# Patient Record
Sex: FEMALE | Race: WHITE | NOT HISPANIC OR LATINO | URBAN - METROPOLITAN AREA
[De-identification: names, ages, dates, MRNs, and addresses within clinical notes are randomized per-mention and may not be internally consistent; named-entity substitution may affect disease eponyms.]

---

## 2022-12-15 ENCOUNTER — TELEMEDICINE (OUTPATIENT)
Dept: BEHAVIORAL/MENTAL HEALTH CLINIC | Facility: CLINIC | Age: 57
End: 2022-12-15

## 2022-12-15 DIAGNOSIS — F41.1 GENERALIZED ANXIETY DISORDER: Primary | ICD-10-CM

## 2022-12-15 DIAGNOSIS — F32.A DEPRESSION, UNSPECIFIED DEPRESSION TYPE: ICD-10-CM

## 2022-12-15 NOTE — PSYCH
Virtual Regular Visit    Verification of patient location:    Patient is located in the following state in which I hold an active license Other; [place]      Assessment/Plan:    Problem List Items Addressed This Visit    None  Visit Diagnoses     Generalized anxiety disorder    -  Primary    Depression, unspecified depression type              Goals addressed in session: Anxiety, depression         Reason for visit is No chief complaint on file  Encounter provider Beata Bo    Provider located at [address] [place] [address]  [phone]      Recent Visits  No visits were found meeting these conditions  Showing recent visits within past 7 days and meeting all other requirements  Today's Visits  Date Type Provider Dept   12/15/22 Telemedicine [facility]   Showing today's visits and meeting all other requirements  Future Appointments  No visits were found meeting these conditions  Showing future appointments within next 150 days and meeting all other requirements       The patient was identified by name and date of birth  Duke Rhodes was informed that this is a telemedicine visit and that the visit is being conducted throughthe [employer] platform  She agrees to proceed     My office door was closed  No one else was in the room  She acknowledged consent and understanding of privacy and security of the video platform  The patient has agreed to participate and understands they can discontinue the visit at any time  Patient is aware this is a billable service  Subjective  Duke Rhodes is a 62 y o  female    HPI     No past medical history on file  No past surgical history on file  No current outpatient medications on file  No current facility-administered medications for this visit          Not on File    Review of Systems    Video Exam    There were no vitals filed for this visit  Physical Exam     Data:  Client presented for a telehealth session via [facility]  Client reported that since her last appointment (over several months ago) client had slipped into "old habits " She mentioned that she is two months behind rent, her landlord is beginning the eviction process, have not talked to her brothers in months, and her sister is no longer supporting her financially  Client shared that due to this, client had created a [employer] page, which did not get the attention she was hoping for  She expressed that a few friends had reached out thinking she was hacked, and the conversations did not go accordingly  She does have a friend who sent her a check, in which she is waiting for  In the meantime, client had looked/researched for a "regular" job rather than [employer]-ing, but has been unsuccessful  Client addressed that she does not trust herself in committing to a full-time position  Client reported self-doubt and how she is not capable of being successful  She addressed how she used to not have these issues and how she was independent and successful  Assessment:  Client presented with a calm affect and mood  Client was engaged, talkative and oriented  Speech was clear and organized and tone was within normal range  Mood was congruent  Client got tearful throughout session  No reports of SI/HI  Process:  Client processed current events and stressors  Clinician actively listened, provided emotional support and encouraged client to look into reconnecting with her , Kalli Ho  Client and clinician talked about barriers and how she is her only barrier  Clinician asked client to reflect on "why" she is doubting herself, when she has proven that she is capable of being successful and independent (based on previous conversations)  Clinician to e-mail self-worth/self-esteem handouts to review  Plan:  Client is scheduled for a telehealth session on Thursday December 22nd at [facility]  Client plans to reflect on questions addressed in session  Will review handouts and discuss next session       Visit Time  12/15/2022  Visit Start Time: 0900  Visit Stop Time: 6826  Total Visit Duration: 53 minutes

## 2022-12-22 ENCOUNTER — TELEMEDICINE (OUTPATIENT)
Dept: BEHAVIORAL/MENTAL HEALTH CLINIC | Facility: CLINIC | Age: 57
End: 2022-12-22

## 2022-12-22 DIAGNOSIS — F41.1 GENERALIZED ANXIETY DISORDER: Primary | ICD-10-CM

## 2022-12-22 DIAGNOSIS — F32.A DEPRESSION, UNSPECIFIED DEPRESSION TYPE: ICD-10-CM

## 2022-12-22 NOTE — PSYCH
Virtual Regular Visit    Verification of patient location:    Patient is located in the following state in which I hold an active license Other; [place]      Assessment/Plan:    Problem List Items Addressed This Visit    None  Visit Diagnoses     Generalized anxiety disorder    -  Primary    Depression, unspecified depression type              Goals addressed in session: Anxiety       Reason for visit is No chief complaint on file  Encounter provider Karissa Michaels    Provider located at [address] [place] [address]  [phone]      Recent Visits  Date Type Provider Dept   12/15/22 Telemedicine [facility]   Showing recent visits within past 7 days and meeting all other requirements  Today's Visits  Date Type Provider Dept   12/22/22 Telemedicine [facility]   Showing today's visits and meeting all other requirements  Future Appointments  No visits were found meeting these conditions  Showing future appointments within next 150 days and meeting all other requirements       The patient was identified by name and date of birth  Isaías Montiel was informed that this is a telemedicine visit and that the visit is being conducted throughthe [facility] platform  She agrees to proceed     My office door was closed  No one else was in the room  She acknowledged consent and understanding of privacy and security of the video platform  The patient has agreed to participate and understands they can discontinue the visit at any time  Patient is aware this is a billable service  Subjective  Isaías Montiel is a 62 y o  female   HPI     No past medical history on file  No past surgical history on file  No current outpatient medications on file       No current facility-administered medications for this visit  Not on File    Review of Systems    Video Exam    There were no vitals filed for this visit  Physical Exam     Data:  Client arrived for her telehealth session via [address]  Client reported that she had been in contact with her landlord, who is giving her to the beginning of January to pay her overdue rent  Client shared that she is very fortunate to have him, stating that they get along and he is understanding/flexible  Client received money from a friend, which has helped pay for other necessities  Client talked about her sister, and how her sister supported her in connecting with [employer]  Client mentioned that she was able to renew her SNAP benefits, apply for an Obama phone through [employer], obtained resources at [employer] and reconnected with [employer] ()  Client voiced that within the last week, she had applied to several jobs that are within walking distance  Client expressed that she would like to get connected with a , addressing how she does not want her sister to take on that role  Client also talked about an upcoming holiday party that she does not want to attend and described her reasons for not wanting to go  Assessment:  Client presented with a calm affect and mood  Client was engaged, talkative and oriented  Speech was clear and organized and tone was within normal range  Mood was congruent  Eye contact was consistent throughout session  Client was dressed casually but appropriately  No reports of SI/HI  Process:  Client processed current events and stressors  Clinician actively listened, provided emotional support and explored the process of connecting to a  at [facility]  Emphasized the importance of learning and obtaining resources within her community  Provided other free phone services and encouraged her to apply to them  Will look into referring client to case management       Plan:  Client is scheduled for a telehealth session on Thursday December 29th at [address]  Client will continue to apply and look into resources within her community  Follow-up with job applications       Visit Time  12/22/2022  Visit Start Time: 7265  Visit Stop Time: 1000  Total Visit Duration: 56 minutes

## 2022-12-29 ENCOUNTER — TELEMEDICINE (OUTPATIENT)
Dept: BEHAVIORAL/MENTAL HEALTH CLINIC | Facility: CLINIC | Age: 57
End: 2022-12-29

## 2022-12-29 DIAGNOSIS — F32.A DEPRESSION, UNSPECIFIED DEPRESSION TYPE: ICD-10-CM

## 2022-12-29 DIAGNOSIS — F41.1 GENERALIZED ANXIETY DISORDER: Primary | ICD-10-CM

## 2022-12-29 NOTE — PSYCH
Virtual Regular Visit    Verification of patient location:    Patient is located in the following state in which I hold an active license Other; [place]      Assessment/Plan:    Problem List Items Addressed This Visit    None  Visit Diagnoses     Generalized anxiety disorder    -  Primary    Depression, unspecified depression type              Goals addressed in session: Anxiety         Reason for visit is No chief complaint on file  Encounter provider Eva Pires    Provider located at [address] [place] [address] [address]  [address] [place] [address]  [phone]      Recent Visits  Date Type Provider Dept   12/22/22 Telemedicine [facility]   Showing recent visits within past 7 days and meeting all other requirements  Today's Visits  Date Type Provider Dept   12/29/22 Telemedicine [facility] Therap[facility]   Showing today's visits and meeting all other requirements  Future Appointments  No visits were found meeting these conditions  Showing future appointments within next 150 days and meeting all other requirements       The patient was identified by name and date of birth  Kat Hickey was informed that this is a telemedicine visit and that the [facility]  She agrees to proceed     My office door was closed  No one else was in the room  She acknowledged consent and understanding of privacy and security of the video platform  The patient has agreed to participate and understands they can discontinue the visit at any time  Patient is aware this is a billable service  Subjective  Kat Hickey is a 62 y o  female   HPI     No past medical history on file  No past surgical history on file  No current outpatient medications on file       No current facility-administered medications for this visit  Not on File    Review of Systems    Video Exam    There were no vitals filed for this visit  Physical Exam     Data:  Client arrived for her telehealth session via [address]  Client reported that she had kept herself busy since she decided not to attend her family's [place] gathering  She mentioned that she had gone to the [employer], attended [employer] service and spent time with her neighbor  She shared that she had gotten close with her neighbor through the years and considers her and her family friends  Client highlighted that she had spent the holidays the way she wanted  She did reach out to her siblings on Christmas, wishing them all a "Happy holiday " She voiced that it was nice to hear from them  Client was eager to report that she was approved for a [employer] phone and is in the process of waiting to receive the phone in the mail  She mentioned that she is looking forward to being able to text and call people  Client has not heard back from any of the jobs she applied to, but is waiting to follow-up after the new year  Client is looking into connecting with a   Assessment:  Client presented with a calm affect and mood  Client was engaged, talkative and oriented  Speech was clear and organized and tone was within normal range  Mood was congruent  Eye contact was consistent throughout  Client was dressed casually but appropriately  No reports of SI/HI  Process:  Client processed current events and stressors  Clinician actively listened, provided emotional support and was encouraged to hear that she was approved for the [employer] phone  Client was encouraged to seek out a  through the resource program she was provided  Clinician informed client that she may get connected to one sooner through the other program than going through [facility]       Plan:  Client is scheduled for a telehealth session on Wednesday January 4th at 8am  Client will inquire about a , 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

## 2023-01-04 ENCOUNTER — TELEMEDICINE (OUTPATIENT)
Dept: BEHAVIORAL/MENTAL HEALTH CLINIC | Facility: CLINIC | Age: 58
End: 2023-01-04

## 2023-01-04 DIAGNOSIS — F32.A DEPRESSION, UNSPECIFIED DEPRESSION TYPE: ICD-10-CM

## 2023-01-04 DIAGNOSIS — F41.1 GENERALIZED ANXIETY DISORDER: Primary | ICD-10-CM

## 2023-01-04 NOTE — PSYCH
Virtual Regular Visit    Verification of patient location:    Patient is located in the following state in which I hold an active license Other; [place]       Assessment/Plan:    Problem List Items Addressed This Visit    None  Visit Diagnoses     Generalized anxiety disorder    -  Primary    Depression, unspecified depression type              Goals addressed in session: Anxiety, Depression         Reason for visit is No chief complaint on file  Encounter provider Lydia Ware    Provider located at [address] [place] [address]  [phone]      Recent Visits  Date Type Provider Dept   12/29/22 Telemedicine [facility]   Showing recent visits within past 7 days and meeting all other requirements  Today's Visits  Date Type Provider Dept   01/04/23 Telemedicine [facility]   Showing today's visits and meeting all other requirements  Future Appointments  No visits were found meeting these conditions  Showing future appointments within next 150 days and meeting all other requirements       The patient was identified by name and date of birth  Milla Pan was informed that this is a telemedicine visit and that the visit is being conducted through[address]  She agrees to proceed     My office door was closed  No one else was in the room  She acknowledged consent and understanding of privacy and security of the video platform  The patient has agreed to participate and understands they can discontinue the visit at any time  Patient is aware this is a billable service  Subjective  Milla Pan is a 62 y o  female   HPI     No past medical history on file  No past surgical history on file  No current outpatient medications on file       No current facility-administered medications for this visit  Not on File    Review of Systems    Video Exam    There were no vitals filed for this visit  Physical Exam     Data:  Client arrived for her virtual session via [address]  Client reported that she had received her [employer] phone and how she had been taking the time to navigate its features  She mentioned that she had already sent texts and made outreach calls to confirm the number was active  Client shared that she had spent most of the day yesterday; filling out applications for housing and utility support  Client expressed that she had been looking into programs provided by [employer]  Client spoke in-length about her landlord and how flexible and patient he has been towards her  She noted that she owes $2,700 in rent and acknowledged that he is proceeding with the eviction process  Client addressed her role within the situation and how she can only blame herself  Client talked about the heightened anxiety she has been experiencing and how she has been utilizing deep breathing, mindfulness meditation to help ease her anxiety  Client shared that her sister was planning to come over for lunch  She addressed not wanting to tell her sister what is going on, but felt she needs to at some point  Assessment:  Client presented with a calm affect and mood  Client was engaged, talkative and oriented  Speech was clear and organized and tone was within normal range  Mood was congruent  Eye contact was consistent throughout and client was dressed casually but appropriately  No reports of SI/HI  Process:  Client processed current events and stressors  Clinician actively listened, provided emotional support and encouraged client to follow-up with her applications  Discussed that she does not "owe anyone an explanation" and how she can decide how much or how little she wants to share with her family  Talked about the concept of control and addressing what is within her control   Will e-mail client material related to anxiety for her to review  Plan:  Client is scheduled for a telehealth session on Thursday January 12th at [address] will continue to utilize deep breathing techniques to help manage anxiety  Will reflect on what is within her control and decide what she feels comfortable disclosing and review anxiety handout(s)      Visit Time  01/04/23  Visit Start Time: 0800  Visit Stop Time: 1280  Total Visit Duration: 57 minutes

## 2023-01-10 ENCOUNTER — TELEPHONE (OUTPATIENT)
Dept: PSYCHIATRY | Facility: CLINIC | Age: 58
End: 2023-01-10

## 2023-01-10 NOTE — TELEPHONE ENCOUNTER
Need updated phone number  Number on file is disconnected  Please call [phone] or update information in my chart    Thank you

## 2023-01-12 ENCOUNTER — TELEPHONE (OUTPATIENT)
Dept: PSYCHIATRY | Facility: CLINIC | Age: 58
End: 2023-01-12

## 2023-01-12 NOTE — TELEPHONE ENCOUNTER
Tried to contacted patient to get 1/12/23 appt rescheduled due to [address] out of the office  Patient to call back to reschedule     Phone number on file is out of service

## 2023-01-19 ENCOUNTER — TELEPHONE (OUTPATIENT)
Dept: BEHAVIORAL/MENTAL HEALTH CLINIC | Facility: CLINIC | Age: 58
End: 2023-01-19

## 2023-01-19 NOTE — TELEPHONE ENCOUNTER
Clinician received email from client at 7:30am this morning needing to cancel today's appointment  Client not feeling well   Next appointment scheduled for Thursday January 26th at [facility]

## 2023-01-26 ENCOUNTER — TELEMEDICINE (OUTPATIENT)
Dept: BEHAVIORAL/MENTAL HEALTH CLINIC | Facility: CLINIC | Age: 58
End: 2023-01-26

## 2023-01-26 DIAGNOSIS — F32.A DEPRESSION, UNSPECIFIED DEPRESSION TYPE: ICD-10-CM

## 2023-01-26 DIAGNOSIS — F41.1 GENERALIZED ANXIETY DISORDER: Primary | ICD-10-CM

## 2023-01-26 NOTE — PSYCH
Virtual Regular Visit    Verification of patient location:    Patient is located in the following state in which I hold an active license Other; [place]      Assessment/Plan:    Problem List Items Addressed This Visit    None  Visit Diagnoses     Generalized anxiety disorder    -  Primary    Depression, unspecified depression type              Goals addressed in session: Goal 1          Reason for visit is   Chief Complaint   Patient presents with   • Virtual Regular Visit        Encounter provider Siddharth Hamlin    Provider located at [address] [place] [address]  [phone]      Recent Visits  Date Type Provider Dept   01/19/23 Telephone [facility]   Showing recent visits within past 7 days and meeting all other requirements  Today's Visits  Date Type Provider Dept   01/26/23 Telemedicine [facility]   Showing today's visits and meeting all other requirements  Future Appointments  No visits were found meeting these conditions  Showing future appointments within next 150 days and meeting all other requirements       The patient was identified by name and date of birth  Micha Salazar was informed that this is a telemedicine visit and [facility] the [facility]  She agrees to proceed     My office door was closed  No one else was in the room  She acknowledged consent and understanding of privacy and security of the video platform  The patient has agreed to participate and understands they can discontinue the visit at any time  Patient is aware this is a billable service  Subjective  Micha Salazar is a 62 y o  female   HPI     No past medical history on file  No past surgical history on file  No current outpatient medications on file       No current facility-administered medications for this visit  Not on File    Review of Systems    Video Exam    There were no vitals filed for this visit  Physical Exam     Behavioral Health Psychotherapy Progress Note    Psychotherapy Provided: Individual Psychotherapy     1  Generalized anxiety disorder        2  Depression, unspecified depression type            Goals addressed in session: Goal 1     DATA: Client presented for her telehealth session via [address]  Client reported that she was approved for rental assistance and how her rent is taken care of until April  Client shared that she is not responsible to pay full rent until May  She noted that she was a bit concerned about the notification she received about the rental assistance  She was never given a formal a letter stating her approval  She received an email  She is hoping that her landlord had received this information  Client also shared that she got a job and started on Tuesday  She mentioned that she got a job at the [employer], stating that it is three blocks away  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  She mentioned having to work four days a week and is off on the weekends  Client highlighted how she is happy to have accomplished two goals that she had last session  Client voiced that her siblings are happy for her  During this session, this clinician used the following therapeutic modalities: Client-centered Therapy    Substance Abuse was not addressed during this session  If the client is diagnosed with a co-occurring substance use disorder, please indicate any changes in the frequency or amount of use: N/A  Stage of change for addressing substance use diagnoses: No substance use/Not applicable    ASSESSMENT:  Steven Gutierrez presents with a Euthymic/ normal mood  her affect is Normal range and intensity, which is congruent, with her mood and the content of the session   The client has made progress on their goals  Jaskaran Rivera presents with a low risk of suicide, low risk of self-harm, and low risk of harm to others  For any risk assessment that surpasses a "low" rating, a safety plan must be developed  A safety plan was indicated: no  If yes, describe in detail N/A    PLAN: Between sessions, Jaskaran Rivera will continue to work towards personal goals and adjust to working  Continue to manage anxiety symptoms as they come  At the next session, the therapist will use Client-centered Therapy and Cognitive Behavioral Therapy to address anxiety  Behavioral Health Treatment Plan and Discharge Planning: Jaskaran Rivera is aware of and agrees to continue to work on their treatment plan  They have identified and are working toward their discharge goals   yes    Visit start and stop times:    01/26/23  Start Time: 0905  Stop Time: 0956  Total Visit Time: 51 minutes

## 2023-02-02 ENCOUNTER — TELEMEDICINE (OUTPATIENT)
Dept: BEHAVIORAL/MENTAL HEALTH CLINIC | Facility: CLINIC | Age: 58
End: 2023-02-02

## 2023-02-02 DIAGNOSIS — F41.1 GENERALIZED ANXIETY DISORDER: Primary | ICD-10-CM

## 2023-02-02 NOTE — PSYCH
Virtual Regular Visit    Verification of patient location:    Patient is located in the following state in which I hold an active license Other; Currently working towards [place] lic[place]       Assessment/Plan:    Problem List Items Addressed This Visit    None  Visit Diagnoses     Generalized anxiety disorder    -  Primary          Goals addressed in session: Anxiety          Reason for visit is   Chief Complaint   Patient presents with   • Virtual Regular Visit        Encounter provider Ashish Henry    Provider located at [address] [place] [address] [address]  [address] [place] [address]  [phone]      Recent Visits  Date Type Provider Dept   01/26/23 [facility]   Showing recent visits within past 7 days and meeting all other requirements  Today's Visits  Date Type Provider Dept   02/02/23 [facility]   Showing today's visits and meeting all other requirements  Future Appointments  No visits were found meeting these conditions  Showing future appointments within next 150 days and meeting all other requirements       The patient was identified by name and date of birth  Ruperto Boas was informed that this is a telemedicine visit and that the [facility]  She agrees to proceed     My office door was closed  No one else was in the room  She acknowledged consent and understanding of privacy and security of the video platform  The patient has agreed to participate and understands they can discontinue the visit at any time  Patient is aware this is a billable service  Subjective  Ruperto Boas is a 62 y o  female   HPI     No past medical history on file  No past surgical history on file  No current outpatient medications on file       No current facility-administered medications for this visit  Not on File    Review of Systems    Video Exam    There were no vitals filed for this visit  Physical Exam     Behavioral Health Psychotherapy Progress Note    Psychotherapy Provided: Individual Psychotherapy     1  Generalized anxiety disorder            Goals addressed in session: Anxiety    DATA: Client presented for her telehealth session via [address]  Client reported that she is still adjusting to work  She mentioned that she is feeling more tired than usual, but believes this is due to the physicality of the job  She described having to be on her feet majority of the shift and is allowed to sit for a few minutes  Client expressed how accommodating her boss is when it comes to her diabetes and needing to eat and sit down  Client shared that she gets along with her coworkers and that she received a new schedule which will begin in the next couple weeks  Client addressed that she had talked with her landlord and he received his rent up until April  Client spoke about feeling relieved and is looking forward to rebuilding her financial situation  Tiny talked about needing to re-establish a budget and be more mindful of her bills and extra expenditures  Client voiced that although she is happy to have found a job of walking distance, she still feels as though she is not doing enough  She also disclosed disappointment within herself that she had to reach out for rental assistance  Client also disclosed that today is the anniversary of her father's passing and how it has been the first time in years where she is not in a very sad/depressive state over it  During this session, this clinician used the following therapeutic modalities: Bereavement Therapy, Client-centered Therapy and Cognitive Behavioral Therapy    Substance Abuse was not addressed during this session   If the client is diagnosed with a co-occurring substance use disorder, please indicate any changes in the frequency or amount of use: N/A  Stage of change for addressing substance use diagnoses: No substance use/Not applicable    ASSESSMENT:  Chantel Kaminski presents with a Euthymic/ normal mood  her affect is Normal range and intensity, which is congruent, with her mood and the content of the session  The client has made progress on their goals  Chantel Kaminksi presents with a low risk of suicide, low risk of self-harm, and low risk of harm to others  For any risk assessment that surpasses a "low" rating, a safety plan must be developed  A safety plan was indicated: no  If yes, describe in detail N/A    PLAN: Between sessions, Chantel Kaminski will continue to write down small, obtainable goals, will work on budgeting and to do something in honor of her father  At the next session, the therapist will use Client-centered Therapy and Cognitive Behavioral Therapy to address anxiety  Behavioral Health Treatment Plan and Discharge Planning: Chantel Kaminski is aware of and agrees to continue to work on their treatment plan  They have identified and are working toward their discharge goals   yes    Visit start and stop times:    02/02/23  Start Time: 0902  Stop Time: 1000  Total Visit Time: 58 minutes

## 2023-02-09 ENCOUNTER — TELEMEDICINE (OUTPATIENT)
Dept: BEHAVIORAL/MENTAL HEALTH CLINIC | Facility: CLINIC | Age: 58
End: 2023-02-09

## 2023-02-09 DIAGNOSIS — F41.1 GENERALIZED ANXIETY DISORDER: Primary | ICD-10-CM

## 2023-02-09 NOTE — PSYCH
Virtual Regular Visit    Verification of patient location:    Patient is located in the following state in which I hold an active license Other; [address] working towards [place] licensure       Assessment/Plan:    Problem List Items Addressed This Visit    None  Visit Diagnoses     Generalized anxiety disorder    -  Primary          Goals addressed in session: Goal 1          Reason for visit is   Chief Complaint   Patient presents with   • Virtual Regular Visit        Encounter provider Connie Godoy    Provider located at [address] [place] [address]  [phone]      Recent Visits  Date Type Provider Dept   02/02/23 [facility]   Showing recent visits within past 7 days and meeting all other requirements  Today's Visits  Date Type Provider Dept   02/09/23 [facility]   Showing today's visits and meeting all other requirements  Future Appointments  No visits were found meeting these conditions  Showing future appointments within next 150 days and meeting all other requirements       The patient was identified by name and date of birth  Genoveva Carbajal was informed that this is a telemedicine visit and that [facility]  She agrees to proceed     My office door was closed  No one else was in the room  She acknowledged consent and understanding of privacy and security of the video platform  The patient has agreed to participate and understands they can discontinue the visit at any time  Patient is aware this is a billable service  Subjective  Genoveva Carbajal is a 62 y o  female   HPI     No past medical history on file  No past surgical history on file  No current outpatient medications on file       No current facility-administered medications for this visit  Not on File    Review of Systems    Video Exam    There were no vitals filed for this visit  Physical Exam     Behavioral Health Psychotherapy Progress Note    Psychotherapy Provided: Individual Psychotherapy     1  Generalized anxiety disorder            Goals addressed in session: Goal 1     DATA: Client presented for her telehealth session via [address]  Client reported that she has began feeling the physical effects of the job  She mentioned that cleaning and being on her feet have taken a toll on her body  Client shared that she had recently found out that her one co-worker is transgender (male to female) and was curious to learn more about the process  Client expressed wanting to educate herself a little more around this, making note how this is all still fairly new to her  Client talked about her birthday and how her work made her feel special  She highlighted that a customer had come in and brought her a cupcake to celebrate  She was also given a jon and a sash to wear during her shift  She is supposed to be getting lunch with her sister later to celebrate  During this session, this clinician used the following therapeutic modalities: Client-centered Therapy and Cognitive Behavioral Therapy    Substance Abuse was not addressed during this session  If the client is diagnosed with a co-occurring substance use disorder, please indicate any changes in the frequency or amount of use: N/A  Stage of change for addressing substance use diagnoses: No substance use/Not applicable    ASSESSMENT:  Chantel Kaminski presents with a Euthymic/ normal mood  her affect is Normal range and intensity, which is congruent, with her mood and the content of the session  The client has made progress on their goals  Chantel Kaminski presents with a low risk of suicide, low risk of self-harm, and low risk of harm to others      For any risk assessment that surpasses a "low" rating, a safety plan must be developed  A safety plan was indicated: no  If yes, describe in detail N/A    PLAN: Between sessions, Jessica Cooper will continue to manage anxiety and work  At the next session, the therapist will use Client-centered Therapy and Cognitive Behavioral Therapy to address anxiety and work stressors  Behavioral Health Treatment Plan and Discharge Planning: Jessica Cooper is aware of and agrees to continue to work on their treatment plan  They have identified and are working toward their discharge goals   yes    Visit start and stop times:    02/09/23  Start Time: 1000  Stop Time: 1058  Total Visit Time: 58 minutes

## 2023-02-16 ENCOUNTER — TELEMEDICINE (OUTPATIENT)
Dept: BEHAVIORAL/MENTAL HEALTH CLINIC | Facility: CLINIC | Age: 58
End: 2023-02-16

## 2023-02-16 DIAGNOSIS — F41.1 GENERALIZED ANXIETY DISORDER: Primary | ICD-10-CM

## 2023-02-16 NOTE — PSYCH
Virtual Regular Visit    Verification of patient location:    Patient is located in the following state in which I hold an active license Other; [place]       Assessment/Plan:    Problem List Items Addressed This Visit    None  Visit Diagnoses     Generalized anxiety disorder    -  Primary          Goals addressed in session: Goal 1          Reason for visit is   Chief Complaint   Patient presents with   • Virtual Regular Visit        Encounter provider Jorge Billy    Provider located at [address] [place] [address] [address]  [address] [place] [address]  [phone]      Recent Visits  Date Type Provider Dept   02/09/23 [facility]   Showing recent visits within past 7 days and meeting all other requirements  Today's Visits  Date Type Provider Dept   02/16/23 [facility]   Showing today's visits and meeting all other requirements  Future Appointments  No visits were found meeting these conditions  Showing future appointments within next 150 days and meeting all other requirements       The patient was identified by name and date of birth  Rock Tinajero was informed that this is a telemedicine visit and that the [facility]  She agrees to proceed     My office door was closed  No one else was in the room  She acknowledged consent and understanding of privacy and security of the video platform  The patient has agreed to participate and understands they can discontinue the visit at any time  Patient is aware this is a billable service  Subjective  Rock Tinajero is a 62 y o  female   HPI     No past medical history on file  No past surgical history on file  No current outpatient medications on file       No current facility-administered medications for this visit  Not on File    Review of Systems    Video Exam    There were no vitals filed for this visit  Physical Exam     Behavioral Health Psychotherapy Progress Note    Psychotherapy Provided: Individual Psychotherapy     1  Generalized anxiety disorder            Goals addressed in session: Goal 1     DATA: Client presented for a telehealth session via [address]  Client reported that she has been closely monitoring her sugar levels, since they were extremely high after eating oatmeal with a banana  She mentioned that she had switched it up today; eating blueberries with oatmeal and how her levels are normal  She expressed that she was going to see if she could eat a banana by itself and if this will effect her sugar levels  In the meantime, client talked in-length about her job  She described the parts of the job she likes and dislikes  She acknowledged that this is just a job, and one that she does not feel fully satisfied in  She does enjoy the flexibility and how they are accommodating  She will begin her new work schedule next week (work two days, off on Wednesday and work the other two days)  Client spoke about her brother and how she is worried about him  She voiced that she has not seen him since last Easter and how she saw a recent photo of him and felt he did not look right  During this session, this clinician used the following therapeutic modalities: Client-centered Therapy and Cognitive Behavioral Therapy    Substance Abuse was not addressed during this session  If the client is diagnosed with a co-occurring substance use disorder, please indicate any changes in the frequency or amount of use: N/A  Stage of change for addressing substance use diagnoses: No substance use/Not applicable    ASSESSMENT:  Ruperto Boas presents with a Euthymic/ normal mood  her affect is Normal range and intensity, which is congruent, with her mood and the content of the session   The client has made progress on their goals  Scarlett Pereyra presents with a low risk of suicide, low risk of self-harm, and low risk of harm to others  For any risk assessment that surpasses a "low" rating, a safety plan must be developed  A safety plan was indicated: no  If yes, describe in detail N/A    PLAN: Between sessions, Scarlett Pereyra will continue to go to work and manage stressors/anxieties related to work  At the next session, the therapist will use Client-centered Therapy and Cognitive Behavioral Therapy to address anxiety  Behavioral Health Treatment Plan and Discharge Planning: Scarlett Pereyra is aware of and agrees to continue to work on their treatment plan  They have identified and are working toward their discharge goals   yes    Visit start and stop times:    02/16/23  Start Time: 0900  Stop Time: 0959  Total Visit Time: 59 minutes

## 2023-02-23 ENCOUNTER — TELEMEDICINE (OUTPATIENT)
Dept: BEHAVIORAL/MENTAL HEALTH CLINIC | Facility: CLINIC | Age: 58
End: 2023-02-23

## 2023-02-23 DIAGNOSIS — F41.1 GENERALIZED ANXIETY DISORDER: Primary | ICD-10-CM

## 2023-02-23 NOTE — PSYCH
Virtual Regular Visit    Verification of patient location:    Patient is located in the following state in which I hold an active license Other; Currently [place] towards [place]       Assessment/Plan:    Problem List Items Addressed This Visit    None  Visit Diagnoses     Generalized anxiety disorder    -  Primary          Goals addressed in session: Goal 1          Reason for visit is   Chief Complaint   Patient presents with   • Virtual Regular Visit        Encounter provider Ayde Alaniz    Provider located at [address] [place] [address] [address]  [address] [place] [address]  [phone]      Recent Visits  Date Type Provider Dept   02/16/23 [facility]   Showing recent visits within past 7 days and meeting all other requirements  Today's Visits  Date Type Provider Dept   02/23/23 [facility]   Showing today's visits and meeting all other requirements  Future Appointments  No visits were found meeting these conditions  Showing future appointments within next 150 days and meeting all other requirements       The patient was identified by name and date of birth  Crissy Reina was informed that this is a telemedicine visit and that the [facility]  She agrees to proceed     My office door was closed  No one else was in the room  She acknowledged consent and understanding of privacy and security of the video platform  The patient has agreed to participate and understands they can discontinue the visit at any time  Patient is aware this is a billable service  Subjective  Crissy Reina is a 62 y o  female   HPI     No past medical history on file  No past surgical history on file  No current outpatient medications on file       No current facility-administered medications for this visit  Not on File    Review of Systems    Video Exam    There were no vitals filed for this visit  Physical Exam     Behavioral Health Psychotherapy Progress Note    Psychotherapy Provided: Individual Psychotherapy     1  Generalized anxiety disorder            Goals addressed in session: Goal 1     DATA: Client presented for a telehealth session via [address]  Client reported that she had been doing well overall  She shared that she had out to lunch with her sister; to celebrate their belated birthdays and how they had gone shopping afterwards  Client highlighted that they had a "really nice afternoon together " Client talked about her job and how people are noticing a change in her mood  As discussed in previous session; this job is not her desired/dream job, but it provides her financial stability, community engagement and independence  She addressed that her sister had made a comment about her overall mood and how she seems "happier " Client talked about her brother and how she was able to get a hold of him  She voiced that he is not doing well physically  She noted that he has been on and off antibiotics due to an infection and the doctors just discovered another tumor  She mentioned wanting to invite him and his wife over for dinner this weekend or find transportation to travel to their place if he is too tired  During this session, this clinician used the following therapeutic modalities: Client-centered Therapy and Cognitive Behavioral Therapy    Substance Abuse was not addressed during this session  If the client is diagnosed with a co-occurring substance use disorder, please indicate any changes in the frequency or amount of use: N/A  Stage of change for addressing substance use diagnoses: No substance use/Not applicable    ASSESSMENT:  Maribel Suero presents with a Euthymic/ normal mood       her affect is Normal range and intensity, which is congruent, with her mood and the content of the session  The client has made progress on their goals  Sarah Faustin presents with a low risk of suicide, low risk of self-harm, and low risk of harm to others  For any risk assessment that surpasses a "low" rating, a safety plan must be developed  A safety plan was indicated: no  If yes, describe in detail N/A    PLAN: Between sessions, Sarah Faustin will continue to work her scheduled shifts at work  Will figure out transportation if needed to see her brother  Will continue to utilize coping skills to manage stress/anxiety  At the next session, the therapist will use Client-centered Therapy and Cognitive Behavioral Therapy to address anxieties related to work and family  Behavioral Health Treatment Plan and Discharge Planning: Sarah Faustin is aware of and agrees to continue to work on their treatment plan  They have identified and are working toward their discharge goals   yes    Visit start and stop times:    02/23/23  Start Time: 0900  Stop Time: 0959  Total Visit Time: 59 minutes

## 2023-03-02 ENCOUNTER — TELEPHONE (OUTPATIENT)
Dept: BEHAVIORAL/MENTAL HEALTH CLINIC | Facility: CLINIC | Age: 58
End: 2023-03-02

## 2023-03-02 NOTE — TELEPHONE ENCOUNTER
Clinician received e-mail from client needing to cancel today's appointment  Next appointment scheduled for next week

## 2023-03-09 ENCOUNTER — TELEPHONE (OUTPATIENT)
Dept: BEHAVIORAL/MENTAL HEALTH CLINIC | Facility: CLINIC | Age: 58
End: 2023-03-09

## 2023-03-09 NOTE — TELEPHONE ENCOUNTER
Clinician received e-mail from client, stating that she got called into work  Requested clinician to provide dates/times to reschedule  Clinician responded to e-mail, informing client of next availability  Will schedule once an appointment is confirmed

## 2023-04-26 ENCOUNTER — TELEMEDICINE (OUTPATIENT)
Dept: BEHAVIORAL/MENTAL HEALTH CLINIC | Facility: CLINIC | Age: 58
End: 2023-04-26

## 2023-04-26 DIAGNOSIS — F41.1 GENERALIZED ANXIETY DISORDER: Primary | ICD-10-CM

## 2023-04-26 NOTE — PSYCH
Virtual Regular Visit    Verification of patient location:    Patient is located at Home in the following state in which I hold an active license Other; [place] working towards [place] lic[place]       Assessment/Plan:    Problem List Items Addressed This Visit    None  Visit Diagnoses     Generalized anxiety disorder    -  Primary          Goals addressed in session: Goal 1          Reason for visit is   Chief Complaint   Patient presents with   • Virtual Regular Visit        Encounter provider Latrell Jolley    Provider located at [address] [place] [address] [address]  [address] [place] [address]  [phone]      Recent Visits  No visits were found meeting these conditions  Showing recent visits within past 7 days and meeting all other requirements  Today's Visits  Date Type Provider Dept   04/26/23 Jaya Esteban Therapist [facility]   Showing today's visits and meeting all other requirements  Future Appointments  No visits were found meeting these conditions  Showing future appointments within next 150 days and meeting all other requirements       The patient was identified by name and date of birth  Ector Anderson was informed that this is a telemedicine visit and [facility] the [facility]  She agrees to proceed     My office door was closed  No one else was in the room  She acknowledged consent and understanding of privacy and security of the video platform  The patient has agreed to participate and understands they can discontinue the visit at any time  Patient is aware this is a billable service  Subjective  Ector Anderson is a 62 y o  female   HPI     No past medical history on file  No past surgical history on file  No current outpatient medications on file  No current facility-administered medications for this visit          Not on File    Review of Systems    Video Exam    There were no vitals filed for this visit  Physical Exam     Behavioral Health Psychotherapy Progress Note    Psychotherapy Provided: Individual Psychotherapy     1  Generalized anxiety disorder            Goals addressed in session: Goal 1     DATA: Client presented for a telehealth session via [address]  Client reported that work has been good but busy  She shared that it is still physically tasking, but feels that she has adjusted to the work  Client expressed wanting to supplement her income through [employer], 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  Client addressed the pros and cons of [employer] and how she needs to find more work  She disclosed that her rent will increase beginning July 1st, and how she needs to be making more to afford the new rent  Client spoke in-length about [employer] and how she would need to rent a car from them, decide whether to rent an electric car or a gas car  She considered all factors and how she is unsure if she should follow through with [employer]  She highlighted the flexibility, but is uncertain if this is worth her time  Client talked in-length about this and wanted insight on what direction she should go  Client and clinician discussed [employer] and other options to consider (look for another part-time job, 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)  Client shared that she was going to see if she could 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  During this session, this clinician used the following therapeutic modalities: Client-centered Therapy and Cognitive Behavioral Therapy    Substance Abuse was not addressed during this session  If the client is diagnosed with a co-occurring substance use disorder, please indicate any changes in the frequency or amount of use: N/A  Stage of change for addressing substance use diagnoses: No substance use/Not applicable    ASSESSMENT:  Klarissa Sams presents with a Euthymic/ normal mood       her "affect is Normal range and intensity, which is congruent, with her mood and the content of the session  The client has made progress on their goals  Michael Funk presents with a low risk of suicide, low risk of self-harm, and low risk of harm to others  For any risk assessment that surpasses a \"low\" rating, a safety plan must be developed  A safety plan was indicated: no  If yes, describe in detail N/A    PLAN: Between sessions, Michael Funk will 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

## 2023-05-10 ENCOUNTER — TELEMEDICINE (OUTPATIENT)
Dept: BEHAVIORAL/MENTAL HEALTH CLINIC | Facility: CLINIC | Age: 58
End: 2023-05-10

## 2023-05-10 DIAGNOSIS — F41.1 GENERALIZED ANXIETY DISORDER: Primary | ICD-10-CM

## 2023-05-10 NOTE — PSYCH
Virtual Regular Visit    Verification of patient location:    Patient is located at Home in the following state in which I hold an active license Other; [place]        Assessment/Plan:    Problem List Items Addressed This Visit    None  Visit Diagnoses     Generalized anxiety disorder    -  Primary          Goals addressed in session: Goal 1          Reason for visit is   Chief Complaint   Patient presents with   • Virtual Regular Visit        Encounter provider Dayanara Moreno    Provider located at [address] [place], [place] [id number]  [address] [place] [address]  [phone]      Recent Visits  No visits were found meeting these conditions  Showing recent visits within past 7 days and meeting all other requirements  Today's Visits  Date Type Provider Dept   05/10/23 Jaya Etseban Therapist [facility]   Showing today's visits and meeting all other requirements  Future Appointments  No visits were found meeting these conditions  Showing future appointments within next 150 days and meeting all other requirements       The patient was identified by name and date of birth  Erma Pantoja was informed that this is a telemedicine visit and [facility] the [facility]  She agrees to proceed     My office door was closed  No one else was in the room  She acknowledged consent and understanding of privacy and security of the video platform  The patient has agreed to participate and understands they can discontinue the visit at any time  Patient is aware this is a billable service  Subjective  Erma Pantoja is a 62 y o  female   HPI     No past medical history on file  No past surgical history on file  No current outpatient medications on file  No current facility-administered medications for this visit          Not on File    Review of Systems    Video Exam    There were no vitals filed for this visit  Physical Exam     Behavioral Health Psychotherapy Progress Note    Psychotherapy Provided: Individual Psychotherapy     1  Generalized anxiety disorder            Goals addressed in session: Goal 1     DATA: Client presented for a telehealth session via [address]  Client reported that she was doing well overall  She expressed concerns regarding her medicaid benefits, stating that she has been hearing on the news the possibility of losing these benefits  Client shared that she is afraid to renew her benefits, fearing that she will no longer qualify  Clinician validated her concerns and explored this further  Discussed reaching out to her insurance company to inquire if these changes would effect her  Client talked in-length about a surprise party her family hosted for her brother, Kirill Moreland  Client highlighted having a nice time and being able to see family and extended family  She emphasized being able to catch up with those she does not see often  She eagerly shared that 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 and how 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 left  Client addressed that it had been years since she had picked up a villa, and how she was able to go through an old routine flawlessly  Clinician actively listened, provided emotional support and was encouraged to hear that client attended her brother's party  Client was unsure if she was going to go, but was happy that she had gone  During this session, this clinician used the following therapeutic modalities: Engagement Strategies, Client-centered Therapy and Cognitive Behavioral Therapy    Substance Abuse was not addressed during this session  If the client is diagnosed with a co-occurring substance use disorder, please indicate any changes in the frequency or amount of use: N/A   Stage of change for addressing substance use diagnoses: No substance use/Not "applicable    ASSESSMENT:  Bayron Coleman presents with a Euthymic/ normal mood  her affect is Normal range and intensity, which is congruent, with her mood and the content of the session  The client has made progress on their goals  Bayron Coleman presents with a low risk of suicide, low risk of self-harm, and low risk of harm to others  For any risk assessment that surpasses a \"low\" rating, a safety plan must be developed  A safety plan was indicated: no  If yes, describe in detail N/A    PLAN: Between sessions, Bayron Coleman will continue to utilize coping skills to manage stress/anxiety  Will inquire about insurance coverage if she is concerned  At the next session, the therapist will use Engagement Strategies, Client-centered Therapy and Cognitive Behavioral Therapy to address anxiety  Behavioral Health Treatment Plan and Discharge Planning: Bayron Coleman is aware of and agrees to continue to work on their treatment plan  They have identified and are working toward their discharge goals   yes    Visit start and stop times:    05/10/23  Start Time: 0900  Stop Time: 3404  Total Visit Time: 55 minutes    "

## 2023-05-17 ENCOUNTER — TELEMEDICINE (OUTPATIENT)
Dept: BEHAVIORAL/MENTAL HEALTH CLINIC | Facility: CLINIC | Age: 58
End: 2023-05-17

## 2023-05-17 DIAGNOSIS — F41.1 GENERALIZED ANXIETY DISORDER: Primary | ICD-10-CM

## 2023-05-17 NOTE — PSYCH
Virtual Regular Visit    Verification of patient location:    Patient is located at Home in the following state in which I hold an active license Other; [place]       Assessment/Plan:    Problem List Items Addressed This Visit    None  Visit Diagnoses     Generalized anxiety disorder    -  Primary          Goals addressed in session: Goal 1          Reason for visit is   Chief Complaint   Patient presents with   • Virtual Regular Visit        Encounter provider Angelo Henry    Provider located at [address] [place] [address]  [phone]      Recent Visits  Date Type Provider Dept   05/10/23 [facility]   Showing recent visits within past 7 days and meeting all other requirements  Today's Visits  Date Type Provider Dept   05/17/23 [facility]med[facility]   Showing today's visits and meeting all other requirements  Future Appointments  No visits were found meeting these conditions  Showing future appointments within next 150 days and meeting all other requirements       The patient was identified by name and date of birth  Siri Trinidad was informed that this is a telemedicine visit and that the [facility]  She agrees to proceed     My office door was closed  No one else was in the room  She acknowledged consent and understanding of privacy and security of the video platform  The patient has agreed to participate and understands they can discontinue the visit at any time  Patient is aware this is a billable service  Subjective  Siri Trinidad is a 62 y o  female   HPI     No past medical history on file  No past surgical history on file  No current outpatient medications on file       No current facility-administered medications for this visit  Not on File    Review of Systems    Video Exam    There were no vitals filed for this visit  Physical Exam     Behavioral Health Psychotherapy Progress Note    Psychotherapy Provided: Individual Psychotherapy     1  Generalized anxiety disorder            Goals addressed in session: Goal 1     DATA: Client presented for a telehealth session via [address]  Client reported that things have been going well overall  She shared that she has been working a lot and trying to rebuild her financials  She shared that she had a dream about money, stating that she knew where this dream stemmed from  Client expressed that she was recently denied [employer] Wiley and how she had applied for [employer] (financial support for utilities)  Client voiced that her landlord is increasing rent from $850 to $900 come July 1st  Client noted that she took this personally, thinking that her landlord wanted her out  Clinician actively listened, provided emotional support and explored ways to help rebuild her financials  Discussed consolidating her credit and debt relief programs  Talked about July 1st being the date for a lot of landlords increasing rent and why this date specifically  During this session, this clinician used the following therapeutic modalities: Client-centered Therapy and Cognitive Behavioral Therapy    Substance Abuse was not addressed during this session  If the client is diagnosed with a co-occurring substance use disorder, please indicate any changes in the frequency or amount of use: N/A  Stage of change for addressing substance use diagnoses: No substance use/Not applicable    ASSESSMENT:  Kvng Pain presents with a Euthymic/ normal mood  her affect is Normal range and intensity, which is congruent, with her mood and the content of the session  The client has made progress on their goals       Kvng Pain presents with a low risk of suicide, low risk of self-harm, and low risk of "harm to others  For any risk assessment that surpasses a \"low\" rating, a safety plan must be developed  A safety plan was indicated: no  If yes, describe in detail N/A    PLAN: Between sessions, Patrice Rivas will continue to utilize coping skills to manage stress/anxiety  Will look into finding ways to rebuild financials  At the next session, the therapist will use Client-centered Therapy and Cognitive Behavioral Therapy to address anxieties related to financial struggles  Behavioral Health Treatment Plan and Discharge Planning: Patrice Rivas is aware of and agrees to continue to work on their treatment plan  They have identified and are working toward their discharge goals   yes    Visit start and stop times:    05/17/23  Start Time: 0906  Stop Time: 1000  Total Visit Time: 54 minutes      "

## 2023-05-24 ENCOUNTER — TELEMEDICINE (OUTPATIENT)
Dept: BEHAVIORAL/MENTAL HEALTH CLINIC | Facility: CLINIC | Age: 58
End: 2023-05-24

## 2023-05-24 DIAGNOSIS — F41.1 GENERALIZED ANXIETY DISORDER: Primary | ICD-10-CM

## 2023-05-24 NOTE — PSYCH
Virtual Regular Visit    Verification of patient location:    Patient is located at Home in the following state in which I hold an active license Other; [place]       Assessment/Plan:    Problem List Items Addressed This Visit    None  Visit Diagnoses     Generalized anxiety disorder    -  Primary          Goals addressed in session: Goal 1          Reason for visit is   Chief Complaint   Patient presents with   • Virtual Regular Visit        Encounter provider Myron Pickering    Provider located at [address] [place] [address]  [phone]      Recent Visits  Date Type Provider Dept   05/17/23 [facility]   Showing recent visits within past 7 days and meeting all other requirements  Today's Visits  Date Type Provider Dept   05/24/23 [facility]   Showing today's visits and meeting all other requirements  Future Appointments  No visits were found meeting these conditions  Showing future appointments within next 150 days and meeting all other requirements       The patient was identified by name and date of birth  Sg Nguyen was informed that this is a telemedicine visit and [facility] the [facility]  She agrees to proceed     My office door was closed  No one else was in the room  She acknowledged consent and understanding of privacy and security of the video platform  The patient has agreed to participate and understands they can discontinue the visit at any time  Patient is aware this is a billable service  Subjective  Sg Nguyen is a 62 y o  female   HPI     No past medical history on file  No past surgical history on file  No current outpatient medications on file       No current facility-administered medications for this visit  Not on File    Review of Systems    Video Exam    There were no vitals filed for this visit  Physical Exam     Behavioral Health Psychotherapy Progress Note    Psychotherapy Provided: Individual Psychotherapy     1  Generalized anxiety disorder            Goals addressed in session: Goal 1     DATA: Client presented for a telehealth session via [address]  Client reported that things have been going well overall  She mentioned experiencing really bad allergies the other day, stating that they were acting up on her way into work  Client highlighted that there are no issues to her medicaid coverage, stating that she had received confirmation that she will still be getting medical insurance  She also mentioned that she had gotten approved for the [facility] program through [employer]  Client will be getting something in the mail to confirm this approval  Client also heard from [facility] and will be receiving a credit that she can use for the upcoming season  Client acknowledged how this has been a huge weight lifted off her shoulders  As per the conversation from last week; client was uncertain of her financial situation  Client addressed that she will be seeing her sister, Blake Romeo later today  She noted that she has a doctors appointment this afternoon that her sister had offered to support her with (transporation)  Client is grateful for this support, but does not want to take advantage of her sister's day off  Clinician actively listened, provided emotional support and encouraged client to look into [employer], [employer] and [employer] transportation services to help with appointments and other needs  During this session, this clinician used the following therapeutic modalities: Client-centered Therapy and Cognitive Behavioral Therapy    Substance Abuse was not addressed during this session   If the client is diagnosed with a co-occurring substance use disorder, please indicate any changes in the "frequency or amount of use: N/A  Stage of change for addressing substance use diagnoses: No substance use/Not applicable    ASSESSMENT:  London Palma presents with a Euthymic/ normal mood  her affect is Normal range and intensity, which is congruent, with her mood and the content of the session  The client has made progress on their goals  London Palma presents with a low risk of suicide, low risk of self-harm, and low risk of harm to others  For any risk assessment that surpasses a \"low\" rating, a safety plan must be developed  A safety plan was indicated: no  If yes, describe in detail N/A    PLAN: Between sessions, London Palma will continue to utilize coping skills to manage stress/anxiety  Will look into transportation services  At the next session, the therapist will use Client-centered Therapy and Cognitive Behavioral Therapy to address anxiety  Behavioral Health Treatment Plan and Discharge Planning: London Palma is aware of and agrees to continue to work on their treatment plan  They have identified and are working toward their discharge goals   yes    Visit start and stop times:    05/24/23  Start Time: 0906  Stop Time: 0958  Total Visit Time: 52 minutes       "

## 2023-05-31 ENCOUNTER — TELEMEDICINE (OUTPATIENT)
Dept: BEHAVIORAL/MENTAL HEALTH CLINIC | Facility: CLINIC | Age: 58
End: 2023-05-31

## 2023-05-31 DIAGNOSIS — F41.1 GENERALIZED ANXIETY DISORDER: Primary | ICD-10-CM

## 2023-05-31 NOTE — PSYCH
Virtual Regular Visit    Verification of patient location:    Patient is located at Home in the following state in which I hold an active license Other; [place]       Assessment/Plan:    Problem List Items Addressed This Visit    None  Visit Diagnoses     Generalized anxiety disorder    -  Primary          Goals addressed in session: Goal 1          Reason for visit is   Chief Complaint   Patient presents with   • Virtual Regular Visit        Encounter provider Otilio Mohs    Provider located at [address] [place], [place] [phone]      Recent Visits  Date Type Provider Dept   05/24/23 [facility]   Showing recent visits within past 7 days and meeting all other requirements  Today's Visits  Date Type Provider Dept   05/31/23 [facility]   Showing today's visits and meeting all other requirements  Future Appointments  No visits were found meeting these conditions  Showing future appointments within next 150 days and meeting all other requirements       The patient was identified by name and date of birth  Keon Jaime was informed that this is a telemedicine visit and [facility]  She agrees to proceed     My office door was closed  No one else was in the room  She acknowledged consent and understanding of privacy and security of the video platform  The patient has agreed to participate and understands they can discontinue the visit at any time  Patient is aware this is a billable service  Subjective  Keon Jaime is a 62 y o  female   HPI     No past medical history on file  No past surgical history on file  No current outpatient medications on file       No current facility-administered medications for this visit  Not on File    Review of Systems    Video Exam    There were no vitals filed for this visit  Physical Exam     Behavioral Health Psychotherapy Progress Note    Psychotherapy Provided: Individual Psychotherapy     1  Generalized anxiety disorder            Goals addressed in session: Goal 1     DATA: Client presented for a telehealth session via [address]  Client reported that she was planning on picking up her rental for [employer]  She mentioned going back and forth in her mind regarding this decision  She shared that she was having second thoughts; unsure if she would be financially making extra money, or spending extra money  She expressed needing to pay for the car rental, gas money, working extra hours in addition to her current job and having enough money to pay outstanding balances  Client talked about wanting to give herself a week to try this out and see if this makes the most sense  Client noted that she was needing to take an [employer] to the car rental place, which was located in [address]  Client noted that she did not have any family who could take her today  Client addressed her plans for [employer] and how she thought this would increase her income  After discussing this in-length, client decided that this would not be a good idea right now  She disclosed her concerns and how she does not think she would be benefiting herself  Client also voiced expenditures she has currently and how she will be behind on rent regardless of if she [employer] or not  Clinician actively listened, provided emotional support, processed the pros and cons of [employer] now or holding off until later  Client decided she was going to hold off and save the money she would have spent on the rental and Ubering there  During this session, this clinician used the following therapeutic modalities: Client-centered Therapy and Cognitive Behavioral Therapy    Substance Abuse was not addressed during this session   If "the client is diagnosed with a co-occurring substance use disorder, please indicate any changes in the frequency or amount of use: N/A  Stage of change for addressing substance use diagnoses: No substance use/Not applicable    ASSESSMENT:  Cielo Perry presents with a Euthymic/ normal mood  her affect is Normal range and intensity, which is congruent, with her mood and the content of the session  The client has made progress on their goals  Cielo Perry presents with a low risk of suicide, low risk of self-harm, and low risk of harm to others  For any risk assessment that surpasses a \"low\" rating, a safety plan must be developed  A safety plan was indicated: no  If yes, describe in detail N/A    PLAN: Between sessions, Cielo Perry will continue to utilize coping skills to manage stress/anxiety  Will reconsider John Rios later and focus on her current financial situation  At the next session, the therapist will use Client-centered Therapy and Cognitive Behavioral Therapy to address anxiety  Behavioral Health Treatment Plan and Discharge Planning: Cielo Perry is aware of and agrees to continue to work on their treatment plan  They have identified and are working toward their discharge goals   yes    Visit start and stop times:    05/31/23  Start Time: 0903  Stop Time: 1000  Total Visit Time: 57 minutes      "

## 2023-06-07 ENCOUNTER — TELEMEDICINE (OUTPATIENT)
Dept: BEHAVIORAL/MENTAL HEALTH CLINIC | Facility: CLINIC | Age: 58
End: 2023-06-07
Payer: COMMERCIAL

## 2023-06-07 DIAGNOSIS — F41.1 GENERALIZED ANXIETY DISORDER: Primary | ICD-10-CM

## 2023-06-07 DIAGNOSIS — F33.0 MAJOR DEPRESSIVE DISORDER, RECURRENT, MILD (HCC): ICD-10-CM

## 2023-06-07 PROCEDURE — 90837 PSYTX W PT 60 MINUTES: CPT | Performed by: SOCIAL WORKER
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
## 2023-06-21 ENCOUNTER — TELEMEDICINE (OUTPATIENT)
Dept: BEHAVIORAL/MENTAL HEALTH CLINIC | Facility: CLINIC | Age: 58
End: 2023-06-21
Payer: COMMERCIAL

## 2023-06-21 DIAGNOSIS — F41.1 GENERALIZED ANXIETY DISORDER: Primary | ICD-10-CM

## 2023-06-21 PROCEDURE — 90837 PSYTX W PT 60 MINUTES: CPT | Performed by: SOCIAL WORKER

## 2023-06-21 NOTE — PSYCH
Virtual Regular Visit    Verification of patient location:    Patient is located at Home in the following state in which I hold an active license Other; [place]       Assessment/Plan:    Problem List Items Addressed This Visit    None  Visit Diagnoses     Generalized anxiety disorder    -  Primary          Goals addressed in session: Goal 1          Reason for visit is   Chief Complaint   Patient presents with   • Virtual Regular Visit        Encounter provider Tiffany Bowens    Provider located at [address] [place] [address]  [phone]      Recent Visits  No visits were found meeting these conditions  Showing recent visits within past 7 days and meeting all other requirements  Today's Visits  Date Type Provider Dept   06/21/23 Jaya Esteban Therapist [facility]   Showing today's visits and meeting all other requirements  Future Appointments  No visits were found meeting these conditions  Showing future appointments within next 150 days and meeting all other requirements       The patient was identified by name and date of birth  Bella Ching was informed that this is a telemedicine visit and that [facility]  She agrees to proceed     My office door was closed  No one else was in the room  She acknowledged consent and understanding of privacy and security of the video platform  The patient has agreed to participate and understands they can discontinue the visit at any time  Patient is aware this is a billable service  Subjective  Bella Ching is a 62 y o  female   HPI     No past medical history on file  No past surgical history on file  No current outpatient medications on file  No current facility-administered medications for this visit          Not on File    Review of "Systems    Video Exam    There were no vitals filed for this visit  Physical Exam     Behavioral Health Psychotherapy Progress Note    Psychotherapy Provided: Individual Psychotherapy     1  Generalized anxiety disorder            Goals addressed in session: Goal 1     DATA: Client presented for a telehealth session via [address]  Client talked briefly about her upcoming week, stating that she will be attending a summer concert (locally) with her sister on on Sunday  Client was informed about needing to update her treatment plan and crisis plan, which she agreed to participate in today  Client and clinician reviewed and updated both documents  Today's session was focused on updating current documentation  During this session, this clinician used the following therapeutic modalities: Client-centered Therapy and Cognitive Behavioral Therapy    Substance Abuse was not addressed during this session  If the client is diagnosed with a co-occurring substance use disorder, please indicate any changes in the frequency or amount of use: N/A  Stage of change for addressing substance use diagnoses: No substance use/Not applicable    ASSESSMENT:  Rosamaria Perales presents with a Euthymic/ normal mood  her affect is Normal range and intensity, which is congruent, with her mood and the content of the session  The client has made progress on their goals  Rosamaria Perales presents with a low risk of suicide, low risk of self-harm, and low risk of harm to others  For any risk assessment that surpasses a \"low\" rating, a safety plan must be developed  A safety plan was indicated: no  If yes, describe in detail N/A    PLAN: Between sessions, Rosamaria Perales will continue to utilize coping skills to manage stress/anxiety  At the next session, the therapist will use Client-centered Therapy and Cognitive Behavioral Therapy to address anxiety      Behavioral Health Treatment Plan and Discharge Planning: Rosamaria Perales " is aware of and agrees to continue to work on their treatment plan  They have identified and are working toward their discharge goals   yes    Visit start and stop times:    06/21/23  Start Time: 1202  Stop Time: 1258  Total Visit Time: 56 minutes

## 2023-06-21 NOTE — BH TREATMENT PLAN
Outpatient Behavioral Health Psychotherapy Treatment Plan    Ermias Ojeda  1965     Date of Initial Psychotherapy Assessment: July 12, 2021  Date of Current Treatment Plan: 06/21/23  Treatment Plan Target Date: September 2023  Treatment Plan Expiration Date: December 2023    Diagnosis:   1  Generalized anxiety disorder            Area(s) of Need: Continue to manage anxiety and depressive symptoms, work towards being less isolating, maintain financial stability and review past handouts/exerices     Long Term Goal 1 (in the client's own words): Manage anxiety and depression    Stage of Change: Contemplation    Target Date for completion: December 2023     Anticipated therapeutic modalities: CBT, Mindfulness     People identified to complete this goal: Self      Objective 1: (identify the means of measuring success in meeting the objective): CBT exercises to counter anxiety thoughts      Objective 2: (identify the means of measuring success in meeting the objective): Identify coping skills      Long Term Goal 2 (in the client's own words):  Work on getting out more (building community) and be less isolating    Stage of Change: Contemplation    Target Date for completion: December 2023     Anticipated therapeutic modalities: CBT      People identified to complete this goal: Self      Objective 1: (identify the means of measuring success in meeting the objective): Engage and explore local churches      Objective 2: (identify the means of measuring success in meeting the objective): Build a sense of community through peers, friends, online etc       Long Term Goal 3 (in the client's own words): Regain financial stability     Stage of Change: Pre-contemplation    Target Date for completion: December 2023     Anticipated therapeutic modalities: CBT     People identified to complete this goal: Self      Objective 1: (identify the means of measuring success in meeting the objective): Continue to work, pickup additional shifts      Objective 2: (identify the means of measuring success in meeting the objective): Create spreadsheet of expenditures to track expenses      I am currently under the care of a [facility] psychiatric provider: no    My [facility] psychiatric provider is: N/A    I am currently taking psychiatric medications: No    I feel that I will be ready for discharge from mental health care when I reach the following (measurable goal/objective): When I have a better handle of my mental health    For children and adults who have a legal guardian:   Has there been any change to custody orders and/or guardianship status? No  If yes, attach updated documentation  I have updated my Crisis Plan and have been offered a copy of this plan    [address]: Diagnosis and Treatment Plan explained to Angelita Saavedra acknowledges an understanding of their diagnosis  Kayleigh Meyers agrees to this treatment plan  I have been offered a copy of this Treatment Plan   no

## 2023-06-21 NOTE — BH CRISIS PLAN
"Client Name: Ermias Ojeda       Client YOB: 1965  : 1965    Treatment Team (include name and contact information):     Psychotherapist: Hugo Jama    Psychiatrist: N/A   Release of information completed: no    \" N/A   Release of information completed: no    Other (Specify Role): N/A    Release of information completed: no    Other (Specify Role): N/A   Release of information completed: no    Healthcare Provider  DO Aida Patterson [address] [address]      Type of Plan   * Child plans (children 15 yo and younger) must be completed and signed by the child's legal guardian   * Plans for all individuals 15 yo and above must be signed by the client  Plan Type: adolescent/adult (14 and over) Update/Review      My Personal Strengths are (in the client's own words): \"Friendly, intelligent, quick learner and a good conversationalist\"    The stressors and triggers that may put me at risk are:  Any form of conflict, unexpected circumstances that take place, financial issues    Coping skills I can use to keep myself calm and safe: Other (describe) Working, doing an activity (cooking, cleaning, baking), watching tv, although it can be both a positive and a negative coping skill (depending on why I am engaging with it)    Coping skills/supports I can use to maintain abstinence from substance use:   Not Applicable    The people that provide me with help and support: (Include name, contact, and how they can help)   Support person #1: Cameron Tano (sister)    * Phone number: in cell phone    * How can they help me? Supportive   Support person #2:Tavo (brother)    * Phone number: in cell phone    * How can they help me? Supportive     Support person #3: Bayron Parada (brother)    * Phone number: in cell phone    * How can they help me?  Supportive    In the past, the following has helped me in times of crisis:    Being in a quiet space, Calling a family member, Breathing exercises (or other " mindfulness-based activities), Listening to music and Watching television or a movie      If it is an emergency and you need immediate help, call 9-1-1    If there is a possibility of danger to yourself or others, call the following crisis hotline resources:     Adult Crisis Numbers  Suicide Prevention Hotline - Dial 9-8-8  [facility]: [address]: [address]: [phone]  [employer] [facility] ([facility]): [phone]  [facility]: [id number] [place] [address] Avenue: [address] [place] [address] St: [phone] (daytime)  [phone] (after hours, weekends, holidays)     Child/Adolescent Crisis Numbers   [facility] \[facility]\"": [address]: [phone]   Elna Soulier: [phone]   [address] ([facility]): [phone]    Please note: Some [facility] do not have a separate number for Child/Adolescent specific crisis  If your county is not listed under Child/Adolescent, please call the adult number for your county     National Talk to Text Line   [facility] - [phone]    In the event your feelings become unmanageable, and you cannot reach your support system, you will call 911 immediately or go to the nearest hospital emergency room

## 2023-07-05 ENCOUNTER — TELEMEDICINE (OUTPATIENT)
Dept: BEHAVIORAL/MENTAL HEALTH CLINIC | Facility: CLINIC | Age: 58
End: 2023-07-05
Payer: COMMERCIAL

## 2023-07-05 DIAGNOSIS — F41.1 GENERALIZED ANXIETY DISORDER: Primary | ICD-10-CM

## 2023-07-05 DIAGNOSIS — F33.0 MAJOR DEPRESSIVE DISORDER, RECURRENT, MILD (HCC): ICD-10-CM

## 2023-07-05 PROCEDURE — 90837 PSYTX W PT 60 MINUTES: CPT | Performed by: SOCIAL WORKER
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
## 2023-07-12 ENCOUNTER — TELEMEDICINE (OUTPATIENT)
Dept: BEHAVIORAL/MENTAL HEALTH CLINIC | Facility: CLINIC | Age: 58
End: 2023-07-12
Payer: COMMERCIAL

## 2023-07-12 DIAGNOSIS — F41.1 GENERALIZED ANXIETY DISORDER: Primary | ICD-10-CM

## 2023-07-12 PROCEDURE — 90832 PSYTX W PT 30 MINUTES: CPT | Performed by: SOCIAL WORKER

## 2023-07-12 NOTE — PSYCH
Virtual Regular Visit    Verification of patient location:    Patient is located at Home in the following state in which I hold an active license Other; [place]      Assessment/Plan:    Problem List Items Addressed This Visit    None  Visit Diagnoses     Generalized anxiety disorder    -  Primary          Goals addressed in session: Goal 1          Reason for visit is   Chief Complaint   Patient presents with   • Virtual Regular Visit        Encounter provider Nasario Seip    Provider located at [address] [place] [address]  [phone]      Recent Visits  Date Type Provider Dept   07/05/23 Telemedicine [address] [place] [address] Therapist [facility]   Showing recent visits within past 7 days and meeting all other requirements  Today's Visits  Date Type Provider Dept   07/12/23 Telemedicine [address] [place] [address] Therapist [facility]   Showing today's visits and meeting all other requirements  Future Appointments  No visits were found meeting these conditions. Showing future appointments within next 150 days and meeting all other requirements       The patient was identified by name and date of birth. James Horowitz was informed that this is a telemedicine visit and [facility] She agrees to proceed. .  My office door was closed. No one else was in the room. She acknowledged consent and understanding of privacy and security of the video platform. The patient has agreed to participate and understands they can discontinue the visit at any time. Patient is aware this is a billable service. Subjective  James Horowitz is a 62 y.o. female . HPI     No past medical history on file. No past surgical history on file. No current outpatient medications on file.      No current facility-administered medications for this visit. Not on File    Review of Systems    Video Exam    There were no vitals filed for this visit. Physical Exam     Behavioral Health Psychotherapy Progress Note    Psychotherapy Provided: Individual Psychotherapy     1. Generalized anxiety disorder            Goals addressed in session: Goal 1     DATA: Client presented for a telehealth session via [address] [place] [address]. Client reported that she was doing well overall. She highlighted that she and her sister are planning to take a beach trip today. She mentioned that they both had talked about going and was happy that it worked out. She noted that they were going to [facility] for the day. Client shared that she found out that her coworker had felt disrespected by the comment she had made. Client has not spoken to him about this since the incident and is unsure if she will address it or wait and see if he will say anything to her. Client talked about making a reservation [facility]. She voiced that she dropped down from four days to three days at work (due to feeling the physical effects of work, and experiencing discomfort in her muscles and joints). She is hoping to [facility] as much as she can. Clinician actively listened, provided emotional support, validated feelings and was encouraged to hear about her beach trip and work. During this session, this clinician used the following therapeutic modalities: Client-centered Therapy and Cognitive Behavioral Therapy    Substance Abuse was not addressed during this session. If the client is diagnosed with a co-occurring substance use disorder, please indicate any changes in the frequency or amount of use: N/A. Stage of change for addressing substance use diagnoses: No substance use/Not applicable    ASSESSMENT:  Razia Dyer presents with a Euthymic/ normal mood. her affect is Normal range and intensity, which is congruent, with her mood and the content of the session.  The client has made progress on their goals. Nikos Sandoval presents with a low risk of suicide, low risk of self-harm, and low risk of harm to others. For any risk assessment that surpasses a "low" rating, a safety plan must be developed. A safety plan was indicated: no  If yes, describe in detail N/A    PLAN: Between sessions, Nikos Sandoval will continue to utilize coping skills to manage stress/anxiety. At the next session, the therapist will use Client-centered Therapy and Cognitive Behavioral Therapy to address anxiety. Behavioral Health Treatment Plan and Discharge Planning: Nikos Sandoval is aware of and agrees to continue to work on their treatment plan. They have identified and are working toward their discharge goals.  yes    Visit start and stop times:    07/12/23  Start Time: 0800  Stop Time: 0830  Total Visit Time: 30 minutes

## 2023-07-19 ENCOUNTER — TELEMEDICINE (OUTPATIENT)
Dept: BEHAVIORAL/MENTAL HEALTH CLINIC | Facility: CLINIC | Age: 58
End: 2023-07-19
Payer: COMMERCIAL

## 2023-07-19 DIAGNOSIS — F33.0 MAJOR DEPRESSIVE DISORDER, RECURRENT, MILD (HCC): ICD-10-CM

## 2023-07-19 DIAGNOSIS — F41.1 GENERALIZED ANXIETY DISORDER: Primary | ICD-10-CM

## 2023-07-19 PROCEDURE — 90837 PSYTX W PT 60 MINUTES: CPT | Performed by: SOCIAL WORKER

## 2023-07-20 NOTE — PSYCH
Virtual Regular Visit    Verification of patient location:    Patient is located at Home in the following state in which I hold an active license Other; [place]       Assessment/Plan:    Problem List Items Addressed This Visit    None  Visit Diagnoses     Generalized anxiety disorder    -  Primary    Major depressive disorder, recurrent, mild ([address])              Goals addressed in session: Goal 1          Reason for visit is   Chief Complaint   Patient presents with   • Virtual Regular Visit        Encounter provider Hola Jennings    Provider located at [id number] [place] [address]  [address]  [phone]      Recent Visits  Date Type Provider Dept   07/12/23 Telemedicine [address] [place] [address] Therapist [facility]   Showing recent visits within past 7 days and meeting all other requirements  Today's Visits  Date Type Provider Dept   07/19/23 Telemedicine [address] [place] [address] Therapist [facility]   Showing today's visits and meeting all other requirements  Future Appointments  No visits were found meeting these conditions. Showing future appointments within next 150 days and meeting all other requirements       The patient was identified by name and date of birth. Ananya Call was informed that this is a telemedicine visit and [facility] She agrees to proceed. .  My office door was closed. No one else was in the room. She acknowledged consent and understanding of privacy and security of the video platform. The patient has agreed to participate and understands they can discontinue the visit at any time. Patient is aware this is a billable service. Yuan Call is a 62 y.o. female . HPI     No past medical history on file. No past surgical history on file.     No current outpatient medications on file.     No current facility-administered medications for this visit. Not on File    Review of Systems    Video Exam    There were no vitals filed for this visit. Physical Exam     Behavioral Health Psychotherapy Progress Note    Psychotherapy Provided: Individual Psychotherapy     1. Generalized anxiety disorder        2. Major depressive disorder, recurrent, mild ([address])            Goals addressed in session: Goal 1     DATA: Client presented for a telehealth session via [facility]. Client reported that she and her sister had a really nice time at [facility] last week. She mentioned that they had met up with some of her sister's friends, which she stated "was nice, but I wished it was just the two of us." Client addressed that it is hard to get one on one time with her sister, but overall had a good time. Client shared that they spent a lot of time reminiscing with one another. Client spoke in-length about her brother, Minus Pries. She noted that he will be getting surgery tomorrow, due to an abscess. Client expressed that he has been getting infections and is unsure what is causing these infections. Client voiced feeling nervous and needing to call him today to check-in. Client disclosed that she and her sister were talking about their views on the  laws and how this was a conflicting topic (varying views). Clinician actively listened, provided emotional support, validated client's concerns and explored ways to cope. During this session, this clinician used the following therapeutic modalities: Client-centered Therapy and Cognitive Behavioral Therapy    Substance Abuse was not addressed during this session. If the client is diagnosed with a co-occurring substance use disorder, please indicate any changes in the frequency or amount of use: N/A.  Stage of change for addressing substance use diagnoses: No substance use/Not applicable    ASSESSMENT:  Kit Keenan presents with a Euthymic/ normal mood.     her affect is Normal range and intensity, which is congruent, with her mood and the content of the session. The client has made progress on their goals. Ubaldo Simmons presents with a low risk of suicide, low risk of self-harm, and low risk of harm to others. For any risk assessment that surpasses a "low" rating, a safety plan must be developed. A safety plan was indicated: no  If yes, describe in detail N/A    PLAN: Between sessions, Ubaldo Simmons will continue to utilize coping skills to manage stress/anxiety. At the next session, the therapist will use Client-centered Therapy and Cognitive Behavioral Therapy to address anxiety. Behavioral Health Treatment Plan and Discharge Planning: Ubaldo Simmons is aware of and agrees to continue to work on their treatment plan. They have identified and are working toward their discharge goals.  yes    Visit start and stop times:    07/19/23  Start Time: 0907  Stop Time: 1000  Total Visit Time: 53 minutes

## 2023-07-26 ENCOUNTER — TELEMEDICINE (OUTPATIENT)
Dept: BEHAVIORAL/MENTAL HEALTH CLINIC | Facility: CLINIC | Age: 58
End: 2023-07-26
Payer: COMMERCIAL

## 2023-07-26 DIAGNOSIS — F41.1 GENERALIZED ANXIETY DISORDER: Primary | ICD-10-CM

## 2023-07-26 PROCEDURE — 90837 PSYTX W PT 60 MINUTES: CPT | Performed by: SOCIAL WORKER

## 2023-07-26 NOTE — PSYCH
Virtual Regular Visit    Verification of patient location:    Patient is located at Home in the following state in which I hold an active license Other; [place]       Assessment/Plan:    Problem List Items Addressed This Visit    None  Visit Diagnoses     Generalized anxiety disorder    -  Primary          Goals addressed in session: Goal 1          Reason for visit is   Chief Complaint   Patient presents with   • Virtual Regular Visit        Encounter provider Brenna Collado    Provider located at [address] [place] [address]  [phone]      Recent Visits  Date Type Provider Dept   07/19/23 Telemedicine [address] [place] [address] Therapist [facility]   Showing recent visits within past 7 days and meeting all other requirements  Today's Visits  Date Type Provider Dept   07/26/23 Telemedicine [address] [place] [address] Therapist [facility]   Showing today's visits and meeting all other requirements  Future Appointments  No visits were found meeting these conditions. Showing future appointments within next 150 days and meeting all other requirements       The patient was identified by name and date of birth. Asa Aquino was informed that this is a telemedicine visit and [facility] She agrees to proceed. .  My office door was closed. No one else was in the room. She acknowledged consent and understanding of privacy and security of the video platform. The patient has agreed to participate and understands they can discontinue the visit at any time. Patient is aware this is a billable service. Subjective  Asa Aquino is a 62 y.o. female . HPI     No past medical history on file. No past surgical history on file. No current outpatient medications on file.      No current facility-administered medications for this visit. Not on File    Review of Systems    Video Exam    There were no vitals filed for this visit. Physical Exam     Behavioral Health Psychotherapy Progress Note    Psychotherapy Provided: Individual Psychotherapy     1. Generalized anxiety disorder            Goals addressed in session: Goal 1     DATA: [facility] Client reported that she had talked with her brother before and after his surgery. She shared that the doctors were able to remove most of the infection. He expressed to her that they were also able to remove 90 percent of the tumor and that he is recovering nicely. He was discharged yesterday, which was the plan. Client talked in-length about this and how she is still worried for him. Disclosed concerns regarding his treatment and how he maintains a positive mindset through it all. Client talked about work and how they have been swamped since both the [employer] and [employer] movies came out. She plans to see the [employer] movie once the grier slows down. Client addressed that she was scheduled to work with her one coworker (the person who was offended by a comment she had made) and how things between them were cordial. She noted that he did not say anything to her about the situation and they were able to have small talk with each other. Clinician actively listened, provided emotional support, validated client's concerns and explored potential reasons for why her brother has a positive attitude. During this session, this clinician used the following therapeutic modalities: Client-centered Therapy and Cognitive Behavioral Therapy    Substance Abuse was not addressed during this session. If the client is diagnosed with a co-occurring substance use disorder, please indicate any changes in the frequency or amount of use: N/A.  Stage of change for addressing substance use diagnoses: No substance use/Not applicable    ASSESSMENT:  Jay Deborrblayne Sams presents with a Euthymic/ normal mood. her affect is Normal range and intensity, which is congruent, with her mood and the content of the session. The client has made progress on their goals. Brook Molina presents with a low risk of suicide, low risk of self-harm, and low risk of harm to others. For any risk assessment that surpasses a "low" rating, a safety plan must be developed. A safety plan was indicated: no  If yes, describe in detail N/A    PLAN: Between sessions, Brook Molina will continue to utilize coping skills to manage stress/anxiety. At the next session, the therapist will use Client-centered Therapy and Cognitive Behavioral Therapy to address anxiety. Behavioral Health Treatment Plan and Discharge Planning: Brook Molina is aware of and agrees to continue to work on their treatment plan. They have identified and are working toward their discharge goals.  yes    Visit start and stop times:    07/26/23  Start Time: 1003  Stop Time: 1059  Total Visit Time: 56 minutes

## 2023-08-16 ENCOUNTER — TELEMEDICINE (OUTPATIENT)
Dept: BEHAVIORAL/MENTAL HEALTH CLINIC | Facility: CLINIC | Age: 58
End: 2023-08-16
Payer: COMMERCIAL

## 2023-08-16 DIAGNOSIS — F41.1 GENERALIZED ANXIETY DISORDER: Primary | ICD-10-CM

## 2023-08-16 DIAGNOSIS — F33.0 MAJOR DEPRESSIVE DISORDER, RECURRENT, MILD (HCC): ICD-10-CM

## 2023-08-16 PROCEDURE — 90834 PSYTX W PT 45 MINUTES: CPT | Performed by: SOCIAL WORKER

## 2023-08-16 NOTE — PSYCH
Virtual Regular Visit    Verification of patient location:    Patient is located at Home in the following state in which I hold an active license Other; [place]       Assessment/Plan:    Problem List Items Addressed This Visit    None  Visit Diagnoses     Generalized anxiety disorder    -  Primary    Major depressive disorder, recurrent, mild ([address])              Goals addressed in session: Goal 1          Reason for visit is   Chief Complaint   Patient presents with   • Virtual Regular Visit        Encounter provider Ernst Mohs    Provider located at [address] [place] [address]  [phone]      Recent Visits  No visits were found meeting these conditions. Showing recent visits within past 7 days and meeting all other requirements  Today's Visits  Date Type Provider Dept   08/16/23 Telemedicine [facility] Therapist [facility]   Showing today's visits and meeting all other requirements  Future Appointments  No visits were found meeting these conditions. Showing future appointments within next 150 days and meeting all other requirements       The patient was identified by name and date of birth. Jacob Chaudhary was informed that this is a telemedicine visit and [facility] She agrees to proceed. .  My office door was closed. No one else was in the room. She acknowledged consent and understanding of privacy and security of the video platform. The patient has agreed to participate and understands they can discontinue the visit at any time. Patient is aware this is a billable service. Subjective  Jacob Chaudhary is a 62 y.o. female . HPI     No past medical history on file. No past surgical history on file. No current outpatient medications on file.      No current facility-administered Cognitive Behavioral Therapy    Substance Abuse was not addressed during this session. If the client is diagnosed with a co-occurring substance use disorder, please indicate any changes in the frequency or amount of use: N/A. Stage of change for addressing substance use diagnoses: No substance use/Not applicable    ASSESSMENT:  Ubaldo Simmons presents with a Euthymic/ normal mood. her affect is Normal range and intensity, which is congruent, with her mood and the content of the session. The client has made progress on their goals. Ubaldo Simmons presents with a low risk of suicide, low risk of self-harm, and low risk of harm to others. For any risk assessment that surpasses a "low" rating, a safety plan must be developed. A safety plan was indicated: no  If yes, describe in detail N/A    PLAN: Between sessions, Ubaldo Simmons will continue to utilize coping skills to manage stress/anixety. At the next session, the therapist will use Client-centered Therapy and Cognitive Behavioral Therapy to address anxiety. Behavioral Health Treatment Plan and Discharge Planning: Ubaldo Simmons is aware of and agrees to continue to work on their treatment plan. They have identified and are working toward their discharge goals.  yes    Visit start and stop times:    08/16/23  Start Time: 1004  Stop Time: 1050  Total Visit Time: 46 minutes

## 2023-09-06 ENCOUNTER — TELEMEDICINE (OUTPATIENT)
Dept: BEHAVIORAL/MENTAL HEALTH CLINIC | Facility: CLINIC | Age: 58
End: 2023-09-06
Payer: COMMERCIAL

## 2023-09-06 DIAGNOSIS — F41.1 GENERALIZED ANXIETY DISORDER: Primary | ICD-10-CM

## 2023-09-06 PROCEDURE — 90837 PSYTX W PT 60 MINUTES: CPT | Performed by: SOCIAL WORKER

## 2023-09-06 NOTE — PSYCH
Virtual Regular Visit    Verification of patient location:    Patient is located at Home in the following state in which I hold an active license Other; [place]       Assessment/Plan:    Problem List Items Addressed This Visit    None  Visit Diagnoses     Generalized anxiety disorder    -  Primary          Goals addressed in session: Goal 1          Reason for visit is   Chief Complaint   Patient presents with   • Virtual Regular Visit        Encounter provider Garret Hernandez    Provider located at [id number] [place] [address]  [address]  [phone]      Recent Visits  No visits were found meeting these conditions. Showing recent visits within past 7 days and meeting all other requirements  Today's Visits  Date Type Provider Dept   09/06/23 Telemedicine [address] [facility] [address] Therapist [facility]   Showing today's visits and meeting all other requirements  Future Appointments  No visits were found meeting these conditions. Showing future appointments within next 150 days and meeting all other requirements       The patient was identified by name and date of birth. Pillo Vieira was informed that this is a telemedicine visit and [facility] [employer] [facility]. She agrees to proceed. .  My office door was closed. No one else was in the room. She acknowledged consent and understanding of privacy and security of the video platform. The patient has agreed to participate and understands they can discontinue the visit at any time. Patient is aware this is a billable service. Subjective  Pillo Vieira is a 62 y.o. female . HPI     No past medical history on file. No past surgical history on file. No current outpatient medications on file. No current facility-administered medications for this visit.         Not on File    Review of Systems    Video Exam    There were no vitals filed for this visit. Physical Exam     Behavioral Health Psychotherapy Progress Note    Psychotherapy Provided: Individual Psychotherapy     1. Generalized anxiety disorder            Goals addressed in session: Goal 1     DATA: Client presented for a telehealth session via [facility]. Client reported that she is in need of finding another job. She expressed struggling financially and how she is behind on rent. Client voiced that she had already talked with her landlord and how they are being extremely flexible and understanding. Client addressed that she will get annuity money beginning of October and how she will pay rent then. Client mentioned that her landlord is avoiding having to send her to court and is open to client paying them in October. Client is in correspondence with the  at work and how embarrassed she is that she has gotten to this point. Client noted that she hopes others within her work do not know her situation. However, she feels somewhat relieved that she is in correspondence with the , who is helping her create a payment plan that she can afford each month. Client highlighted that [facility] [employer] She noted that her brother is back home from the hospital and is hoping to visit him at some point. Client talked about needing to find a new OBGYN provider and how she got a list from her insurance. Client has a diabetes follow-up next week. Clinician actively listened, provided emotional support, validated client's feelings, explored coping skills, addressed her financial concerns and the plan she has setup for herself. During this session, this clinician used the following therapeutic modalities: Client-centered Therapy and Cognitive Behavioral Therapy    Substance Abuse was not addressed during this session.  If the client is diagnosed with a co-occurring substance use disorder, please indicate any changes in the frequency or amount of use: N/A. Stage of change for addressing substance use diagnoses: No substance use/Not applicable    ASSESSMENT:  Taylor Li presents with a Euthymic/ normal mood. her affect is Normal range and intensity, which is congruent, with her mood and the content of the session. The client has made progress on their goals. Taylor Li presents with a low risk of suicide, low risk of self-harm, and low risk of harm to others. For any risk assessment that surpasses a "low" rating, a safety plan must be developed. A safety plan was indicated: no  If yes, describe in detail N/A    PLAN: Between sessions, Taylor Li will continue to utilize coping skills to manage stress/anxiety. At the next session, the therapist will use Client-centered Therapy and Cognitive Behavioral Therapy to address anxiety. Behavioral Health Treatment Plan and Discharge Planning: Taylor Li is aware of and agrees to continue to work on their treatment plan. They have identified and are working toward their discharge goals.  yes    Visit start and stop times:    09/06/23  Start Time: 0806  Stop Time: 0900  Total Visit Time: 54 minutes

## 2023-09-13 ENCOUNTER — TELEMEDICINE (OUTPATIENT)
Dept: BEHAVIORAL/MENTAL HEALTH CLINIC | Facility: CLINIC | Age: 58
End: 2023-09-13
Payer: COMMERCIAL

## 2023-09-13 DIAGNOSIS — F41.1 GENERALIZED ANXIETY DISORDER: Primary | ICD-10-CM

## 2023-09-13 PROCEDURE — 90837 PSYTX W PT 60 MINUTES: CPT | Performed by: SOCIAL WORKER

## 2023-09-13 NOTE — PSYCH
Virtual Regular Visit    Verification of patient location:    Patient is located at Home in the following state in which I hold an active license Other; [place]      Assessment/Plan:    Problem List Items Addressed This Visit    None  Visit Diagnoses     Generalized anxiety disorder    -  Primary          Goals addressed in session: Goal 1          Reason for visit is   Chief Complaint   Patient presents with   • Virtual Regular Visit        Encounter provider Maricruz Moscoso    Provider located at [address] [place], [place] [phone]      Recent Visits  Date Type Provider Dept   09/06/23 Telemedicine [address] [place] [address] Therapist [facility]   Showing recent visits within past 7 days and meeting all other requirements  Today's Visits  Date Type Provider Dept   09/13/23 Telemedicine [address] [place] [address] Therapist [facility]   Showing today's visits and meeting all other requirements  Future Appointments  No visits were found meeting these conditions. Showing future appointments within next 150 days and meeting all other requirements       The patient was identified by name and date of birth. Radha Moon was informed that this is a telemedicine visit and [facility] She agrees to proceed. .  My office door was closed. No one else was in the room. She acknowledged consent and understanding of privacy and security of the video platform. The patient has agreed to participate and understands they can discontinue the visit at any time. Patient is aware this is a billable service. Subjective  Radha Moon is a 62 y.o. female . HPI     No past medical history on file. No past surgical history on file. No current outpatient medications on file.      No current facility-administered medications for this visit. Not on File    Review of Systems    Video Exam    There were no vitals filed for this visit. Physical Exam     Behavioral Health Psychotherapy Progress Note    Psychotherapy Provided: Individual Psychotherapy     1. Generalized anxiety disorder            Goals addressed in session: Goal 1     DATA: Client presented for a telehealth session via [employer] [employer]. Client reported that she was relieved to find out that the inmate who escaped the [facility] intermediate was found. She shared how unsettling it has been the last two weeks. Client talked about work and how her hours have been cut short. She mentioned that work has been slow, which results in not needing staff. She voiced that things tend to pickup again in October. 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 Client mentioned that she should be flattered, but feels uncertain about the idea. Client also shared that she did not get a heads up that she was going to be training a new staff member. Client addressed that she has been trying to get some stuff done, such as making phone calls and completing things from her to-do list. Client voiced that she has been utilizing [employer] as a way to earn additional income. Clinician actively listened, provided emotional support, validated client's feelings, addressed her concerns and processed the training situation and helped client to re-frame thinking and explored coping skills. During this session, this clinician used the following therapeutic modalities: Client-centered Therapy and Cognitive Behavioral Therapy    Substance Abuse was not addressed during this session. If the client is diagnosed with a co-occurring substance use disorder, please indicate any changes in the frequency or amount of use: N/A.  Stage of change for addressing substance use diagnoses: No substance use/Not applicable    ASSESSMENT:  Olenablayne Lakeisha presents with a Euthymic/ normal mood. her affect is Normal range and intensity, which is congruent, with her mood and the content of the session. The client has made progress on their goals. Ravindra Melgar presents with a low risk of suicide, low risk of self-harm, and low risk of harm to others. For any risk assessment that surpasses a "low" rating, a safety plan must be developed. A safety plan was indicated: no  If yes, describe in detail N/A    PLAN: Between sessions, Ravindra Melgar will continue to utilize coping skills to manage stress/anxiety. At the next session, the therapist will use Client-centered Therapy and Cognitive Behavioral Therapy to address anxiety. Behavioral Health Treatment Plan and Discharge Planning: Ravindra Melgar is aware of and agrees to continue to work on their treatment plan. They have identified and are working toward their discharge goals.  yes    Visit start and stop times:    09/13/23  Start Time: 1000  Stop Time: 1058  Total Visit Time: 58 minutes

## 2023-09-21 ENCOUNTER — TELEMEDICINE (OUTPATIENT)
Dept: BEHAVIORAL/MENTAL HEALTH CLINIC | Facility: CLINIC | Age: 58
End: 2023-09-21
Payer: COMMERCIAL

## 2023-09-21 DIAGNOSIS — F41.1 GENERALIZED ANXIETY DISORDER: Primary | ICD-10-CM

## 2023-09-21 PROCEDURE — 90837 PSYTX W PT 60 MINUTES: CPT | Performed by: SOCIAL WORKER

## 2023-09-21 NOTE — PSYCH
Virtual Regular Visit    Verification of patient location:    Patient is located at Home in the following state in which I hold an active license Other; [place]      Assessment/Plan:    Problem List Items Addressed This Visit    None  Visit Diagnoses     Generalized anxiety disorder    -  Primary          Goals addressed in session: Goal 1          Reason for visit is   Chief Complaint   Patient presents with   • Virtual Regular Visit        Encounter provider Crys Sanches    Provider located at [id number] [place] [address]  [address]  [phone]      Recent Visits  No visits were found meeting these conditions. Showing recent visits within past 7 days and meeting all other requirements  Today's Visits  Date Type Provider Dept   09/21/23 Telemedicine [address] [facility] [address] Therapist [facility]   Showing today's visits and meeting all other requirements  Future Appointments  No visits were found meeting these conditions. Showing future appointments within next 150 days and meeting all other requirements       The patient was identified by name and date of birth. Maliha Meléndez was informed that this is a telemedicine visit and [facility] She agrees to proceed. .  My office door was closed. No one else was in the room. She acknowledged consent and understanding of privacy and security of the video platform. The patient has agreed to participate and understands they can discontinue the visit at any time. Patient is aware this is a billable service. Subjective  Maliha Meléndez is a 62 y.o. female . HPI     No past medical history on file. No past surgical history on file. No current outpatient medications on file. No current facility-administered medications for this visit.         Not on File    Review of Systems    Video Exam    There were no vitals filed for this visit. Physical Exam     Behavioral Health Psychotherapy Progress Note    Psychotherapy Provided: Individual Psychotherapy     1. Generalized anxiety disorder            Goals addressed in session: Goal 1     DATA: Client presented for a telehealth session via [employer]. Client reported that she had reapplied for rental assistance, stating that she got a call about it the other day. She is hoping that she will hear from them relatively soon. In the meantime, client spoke around work and her frustrations with the schedule. She mentioned that she was asked to change her days off from Wednesdays to Thursdays and was put on the schedule today without warning. Client addressed having standing appointments that could have been interfered with this schedule change. She voiced that she has not been working much, stating that she only worked one day last week and two days this week. She disclosed wanting more of a conversation/heads up if her schedule is going to change. Client addressed that she is in need of finding another job, noting that this job is not going to cut it financially. She expressed needing to be close to the job, due to not having transportation. She is thinking about buying an electric bike to help her get from place to place, however, she is in the process of crunching numbers. Clinician actively listened, provided emotional support, validated client's feelings, addressed and explored her work concerns, discussed talking about her frustrations with work, explored looking into applying as a  (there's a bank that is of walking distance), explored coping skills. During this session, this clinician used the following therapeutic modalities: Client-centered Therapy and Cognitive Behavioral Therapy    Substance Abuse was not addressed during this session.  If the client is diagnosed with a co-occurring substance use disorder, please indicate any changes in the frequency or amount of use: N/A. Stage of change for addressing substance use diagnoses: No substance use/Not applicable    ASSESSMENT:  Loco Perkins presents with a Euthymic/ normal mood. her affect is Normal range and intensity, which is congruent, with her mood and the content of the session. The client has made progress on their goals. Loco Perkins presents with a low risk of suicide, low risk of self-harm, and low risk of harm to others. For any risk assessment that surpasses a "low" rating, a safety plan must be developed. A safety plan was indicated: no  If yes, describe in detail N/A    PLAN: Between sessions, Loco Perkins will continue to utilize coping skills to manage stress/anxiety. At the next session, the therapist will use Client-centered Therapy and Cognitive Behavioral Therapy to address anxiety. Behavioral Health Treatment Plan and Discharge Planning: Loco Perkins is aware of and agrees to continue to work on their treatment plan. They have identified and are working toward their discharge goals.  yes    Visit start and stop times:    09/21/23  Start Time: 1003  Stop Time: 1100  Total Visit Time: 57 minutes

## 2023-09-28 ENCOUNTER — TELEMEDICINE (OUTPATIENT)
Dept: BEHAVIORAL/MENTAL HEALTH CLINIC | Facility: CLINIC | Age: 58
End: 2023-09-28
Payer: COMMERCIAL

## 2023-09-28 DIAGNOSIS — F41.1 GENERALIZED ANXIETY DISORDER: Primary | ICD-10-CM

## 2023-09-28 PROCEDURE — 90834 PSYTX W PT 45 MINUTES: CPT | Performed by: SOCIAL WORKER

## 2023-09-28 NOTE — PSYCH
Virtual Regular Visit    Verification of patient location:    Patient is located at Home in the following state in which I hold an active license Other; [place]      Assessment/Plan:    Problem List Items Addressed This Visit    None  Visit Diagnoses     Generalized anxiety disorder    -  Primary          Goals addressed in session: Goal 1          Reason for visit is   Chief Complaint   Patient presents with   • Virtual Regular Visit        Encounter provider Hollis Taylor    Provider located at [address] [place] [address]  [phone]      Recent Visits  Date Type Provider Dept   09/21/23 Telemedicine [address] [place] [address] Therapist [facility]   Showing recent visits within past 7 days and meeting all other requirements  Today's Visits  Date Type Provider Dept   09/28/23 Telemedicine [address] [place] [address] Therapist [facility]   Showing today's visits and meeting all other requirements  Future Appointments  No visits were found meeting these conditions. Showing future appointments within next 150 days and meeting all other requirements       The patient was identified by name and date of birth. Kristina Nelson was informed that this is a telemedicine visit and [facility] She agrees to proceed. .  My office door was closed. No one else was in the room. She acknowledged consent and understanding of privacy and security of the video platform. The patient has agreed to participate and understands they can discontinue the visit at any time. Patient is aware this is a billable service. Subjective  Kristina Nelson is a 62 y.o. female . HPI     No past medical history on file. No past surgical history on file. No current outpatient medications on file.      No current facility-administered medications for this visit. Not on File    Review of Systems    Video Exam    There were no vitals filed for this visit. Physical Exam     Behavioral Health Psychotherapy Progress Note    Psychotherapy Provided: Individual Psychotherapy     1. Generalized anxiety disorder            Goals addressed in session: Goal 1     DATA: Client presented for a telehealth session via [address] [place] [address]. Client reported that she had worked earlier this week, but work is still going slow. 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 Client talked about the rental assistance program, and how they had reached out to her landlord. She expressed that they had sent him several forms to fill out and asked a few questions. She noted that he did not want to sign off on some of the requirements, stating that he had CC'd her on their email exchange. Client addressed that she did not care, voicing that he has been so understanding and flexible with her. She will be getting annuity money sometime in October, which will cover her rent. Client was just hoping to have a little more financial cushion. Client highlighted that her nephew is getting  in October and how she is planning to attend the wedding. She disclosed some concerns she has regarding what to wear, but has some ideas. Clinician actively listened, provided emotional support, validated client's feelings and explored coping skills. During this session, this clinician used the following therapeutic modalities: Client-centered Therapy and Cognitive Behavioral Therapy    Substance Abuse was not addressed during this session. If the client is diagnosed with a co-occurring substance use disorder, please indicate any changes in the frequency or amount of use: N/A.  Stage of change for addressing substance use diagnoses: No substance use/Not applicable    ASSESSMENT:  Helen Hidalgo presents with a Euthymic/ normal mood. her affect is Normal range and intensity, which is congruent, with her mood and the content of the session. The client has made progress on their goals. Helen Hidalgo presents with a low risk of suicide, low risk of self-harm, and low risk of harm to others. For any risk assessment that surpasses a "low" rating, a safety plan must be developed. A safety plan was indicated: no  If yes, describe in detail N/A    PLAN: Between sessions, Helen Hidalgo will continue to utilize coping skills to manage stress/anxiety. At the next session, the therapist will use Client-centered Therapy and Cognitive Behavioral Therapy to address anxiety. Behavioral Health Treatment Plan and Discharge Planning: Helen Hidalgo is aware of and agrees to continue to work on their treatment plan. They have identified and are working toward their discharge goals.  yes    Visit start and stop times:    09/28/23  Start Time: 0903  Stop Time: 2979  Total Visit Time: 50 minutes

## 2023-10-12 ENCOUNTER — TELEMEDICINE (OUTPATIENT)
Dept: BEHAVIORAL/MENTAL HEALTH CLINIC | Facility: CLINIC | Age: 58
End: 2023-10-12
Payer: COMMERCIAL

## 2023-10-12 DIAGNOSIS — F41.1 GENERALIZED ANXIETY DISORDER: Primary | ICD-10-CM

## 2023-10-12 PROCEDURE — 90837 PSYTX W PT 60 MINUTES: CPT | Performed by: SOCIAL WORKER

## 2023-10-12 NOTE — PSYCH
Virtual Regular Visit    Verification of patient location:    Patient is located at Home in the following state in which I hold an active license Other; [place]      Assessment/Plan:    Problem List Items Addressed This Visit    None  Visit Diagnoses       Generalized anxiety disorder    -  Primary            Goals addressed in session: Goal 1          Reason for visit is   Chief Complaint   Patient presents with    Virtual Regular Visit          Encounter provider Crys Sanches    Provider located at [address] [place] [address]  [phone]      Recent Visits  No visits were found meeting these conditions. Showing recent visits within past 7 days and meeting all other requirements  Today's Visits  Date Type Provider Dept   10/12/23 Telemedicine [facility] Therapist [facility]   Showing today's visits and meeting all other requirements  Future Appointments  No visits were found meeting these conditions. Showing future appointments within next 150 days and meeting all other requirements       The patient was identified by name and date of birth. Maliha Meléndez was informed that this is a telemedicine visit and [facility] She agrees to proceed. .  My office door was closed. No one else was in the room. She acknowledged consent and understanding of privacy and security of the video platform. The patient has agreed to participate and understands they can discontinue the visit at any time. Patient is aware this is a billable service. Subjective  Maliha Meléndez is a 62 y.o. female . HPI     No past medical history on file. No past surgical history on file. No current outpatient medications on file. No current facility-administered medications for this visit.         Not on File    Review of Systems    Video Exam    There were no vitals filed for this visit. Physical Exam     Behavioral Health Psychotherapy Progress Note    Psychotherapy Provided: Individual Psychotherapy     1. Generalized anxiety disorder            Goals addressed in session: Goal 1     DATA: Client presented for a telehealth session via [facility]. Client reported that she was approved for the housing assistance (rental assistance) and is waiting for the check. She mentioned that she was able to pay her landlord the rent from her annuity. Client shared that she is relieved to finally be paid up and not have to worry about rent money. Client spoke around her nephew's wedding next week and deciding what to wear and booking a hotel room. She mentioned that she had talked with her sister about the upcoming event and what her plans were. Client addressed that her sister must have misunderstood her, stating that her sister gave her attitude. Client noted that she got really upset by this and is unsure if she wants to see her later today. Client voiced that this also brought up a situation that involved her and her brother. Client expressed being sensitive and taking things too personal. Client and clinician processed and talked through her frustrations and how to work and move forward. Encouraged client to write down how she feels and address her feelings when it is an appropriate time to do so, explored coping skills. During this session, this clinician used the following therapeutic modalities: Client-centered Therapy, Cognitive Behavioral Therapy, and Cognitive Processing Therapy    Substance Abuse was not addressed during this session. If the client is diagnosed with a co-occurring substance use disorder, please indicate any changes in the frequency or amount of use: N/A.  Stage of change for addressing substance use diagnoses: No substance use/Not applicable    ASSESSMENT:  Helen Hidalgo presents with a Euthymic/ normal mood.     her affect is Normal range and intensity and Tearful, which is congruent, with her mood and the content of the session. The client has made progress on their goals. Violet lOson presents with a low risk of suicide, low risk of self-harm, and low risk of harm to others. For any risk assessment that surpasses a "low" rating, a safety plan must be developed. A safety plan was indicated: no  If yes, describe in detail N/A    PLAN: Between sessions, Violet Olson will continue to utilize coping skills to manage stress/anxiety. At the next session, the therapist will use Client-centered Therapy, Cognitive Behavioral Therapy, and Cognitive Processing Therapy to address anxiety. Behavioral Health Treatment Plan and Discharge Planning: Violet Olson is aware of and agrees to continue to work on their treatment plan. They have identified and are working toward their discharge goals.  yes    Visit start and stop times:    10/12/23  Start Time: 1001  Stop Time: 1100  Total Visit Time: 59 minutes

## 2023-10-26 ENCOUNTER — TELEMEDICINE (OUTPATIENT)
Dept: BEHAVIORAL/MENTAL HEALTH CLINIC | Facility: CLINIC | Age: 58
End: 2023-10-26
Payer: COMMERCIAL

## 2023-10-26 DIAGNOSIS — F41.1 GENERALIZED ANXIETY DISORDER: Primary | ICD-10-CM

## 2023-10-26 PROCEDURE — 90834 PSYTX W PT 45 MINUTES: CPT | Performed by: SOCIAL WORKER

## 2023-10-26 NOTE — PSYCH
Virtual Regular Visit    Verification of patient location:    Patient is located at Home in the following state in which I hold an active license Other; [place]      Assessment/Plan:    Problem List Items Addressed This Visit    None  Visit Diagnoses       Generalized anxiety disorder    -  Primary            Goals addressed in session: Goal 1          Reason for visit is   Chief Complaint   Patient presents with    Virtual Regular Visit          Encounter provider Jigna Baez    Provider located at [address] [place] [address]  [phone]      Recent Visits  No visits were found meeting these conditions. Showing recent visits within past 7 days and meeting all other requirements  Today's Visits  Date Type Provider Dept   10/26/23 [facility] Therapist [facility]   Showing today's visits and meeting all other requirements  Future Appointments  No visits were found meeting these conditions. Showing future appointments within next 150 days and meeting all other requirements       The patient was identified by name and date of birth. Willie Morales was informed that this is a telemedicine visit and [facility] She agrees to proceed. .  My office door was closed. No one else was in the room. She acknowledged consent and understanding of privacy and security of the video platform. The patient has agreed to participate and understands they can discontinue the visit at any time. Patient is aware this is a billable service. Subjective  Willie Morales is a 62 y.o. female . HPI     No past medical history on file. No past surgical history on file. No current outpatient medications on file. No current facility-administered medications for this visit.         Not on File    Review of Systems    Video Exam    There were no vitals filed for this visit. Physical Exam     Behavioral Health Psychotherapy Progress Note    Psychotherapy Provided: Individual Psychotherapy     1. Generalized anxiety disorder            Goals addressed in session: Goal 1     DATA: Client presented for a telehealth session via [facility]. Client reported that she had the best time at her nephew's wedding this past weekend. She shared that she had traveled with her sister and brother-in-law to get there and back. She expressed that she had bought a hotel room for the weekend, so she could participate in the festivities. Client mentioned that prior to the wedding, she confronted her sister around how she had made her feel the last time they spoke. Client disclosed that her sister made it seem as though she was being inconvenienced when they had plans to get together. Client noted that it took time for her sister to acknowledge where she was coming from, but eventually apologized for the ordeal. Client highlighted that she had a great time catching up with family and seeing everyone together. She did address an issue she encountered when checking into her hotel room. Client voiced that her card got declined and spent time calling her bank for clarification. Client stated that her sister had concerns about this leading up to the wedding and client reassured her that she had the funds in her account to pay for the room. Client mentioned that she was going to figure it out, but her brother had taken care of the room expense. Client also highlighted that she is planning to see the [employer] movie with her sister and sister-in-law later this week for a girls day. Clinician actively listened, provided emotional support, validated client's feelings, addressed her concerns/frustrations, processed recent events and explored coping skills.    During this session, this clinician used the following therapeutic modalities: Client-centered Therapy, Cognitive Behavioral Therapy, and Supportive Psychotherapy    Substance Abuse was not addressed during this session. If the client is diagnosed with a co-occurring substance use disorder, please indicate any changes in the frequency or amount of use: N/A. Stage of change for addressing substance use diagnoses: No substance use/Not applicable    ASSESSMENT:  Florida De La Rosa presents with a Euthymic/ normal mood. her affect is Normal range and intensity, which is congruent, with her mood and the content of the session. The client has made progress on their goals. Florida De La Rosa presents with a low risk of suicide, low risk of self-harm, and low risk of harm to others. For any risk assessment that surpasses a "low" rating, a safety plan must be developed. A safety plan was indicated: no  If yes, describe in detail N/A    PLAN: Between sessions, Florida De La Rosa will continue to utilize coping skills to manage stress/anxiety. At the next session, the therapist will use Client-centered Therapy, Cognitive Behavioral Therapy, and Supportive Psychotherapy to address anxiety. Behavioral Health Treatment Plan and Discharge Planning: Florida De La Rosa is aware of and agrees to continue to work on their treatment plan. They have identified and are working toward their discharge goals.  yes    Visit start and stop times:    10/26/23  Start Time: 1010  Stop Time: 1057  Total Visit Time: 47 minutes

## 2023-11-02 ENCOUNTER — TELEMEDICINE (OUTPATIENT)
Dept: BEHAVIORAL/MENTAL HEALTH CLINIC | Facility: CLINIC | Age: 58
End: 2023-11-02
Payer: COMMERCIAL

## 2023-11-02 DIAGNOSIS — F41.1 GENERALIZED ANXIETY DISORDER: Primary | ICD-10-CM

## 2023-11-02 PROCEDURE — 90834 PSYTX W PT 45 MINUTES: CPT | Performed by: SOCIAL WORKER

## 2023-11-02 NOTE — PSYCH
Virtual Regular Visit    Verification of patient location:    Patient is located at Home in the following state in which I hold an active license Other; [place]      Assessment/Plan:    Problem List Items Addressed This Visit    None  Visit Diagnoses       Generalized anxiety disorder    -  Primary            Goals addressed in session: Goal 1          Reason for visit is   Chief Complaint   Patient presents with    Virtual Regular Visit          Encounter provider Hollis Taylor    Provider located at [address] [place] [address]  [phone]      Recent Visits  Date Type Provider Dept   10/26/23 Telemedicine [address] [place] [address] Therapist [facility]   Showing recent visits within past 7 days and meeting all other requirements  Today's Visits  Date Type Provider Dept   11/02/23 Telemedicine [address] [place] [address] Therapist [facility]   Showing today's visits and meeting all other requirements  Future Appointments  No visits were found meeting these conditions. Showing future appointments within next 150 days and meeting all other requirements       The patient was identified by name and date of birth. Kristina Nelson was informed that this is a telemedicine visit and [facility] She agrees to proceed. .  My office door was closed. No one else was in the room. She acknowledged consent and understanding of privacy and security of the video platform. The patient has agreed to participate and understands they can discontinue the visit at any time. Patient is aware this is a billable service. Subjective  Kristina Nelson is a 62 y.o. female . HPI     No past medical history on file. No past surgical history on file. No current outpatient medications on file.      No current facility-administered medications for this visit. Not on File    Review of Systems    Video Exam    There were no vitals filed for this visit. Physical Exam     Behavioral Health Psychotherapy Progress Note    Psychotherapy Provided: Individual Psychotherapy     1. Generalized anxiety disorder            Goals addressed in session: Goal 1     DATA: Client presented for a telehealth session via [address] [place] [address]. Client reported that she did not have to go into work on Monday, stating that her boss had informed her that there were enough staff to cover the floor. Client worked on Tuesday (Halloween) and how she had created a makeshift costume of a cat. She shared that she did not have the opportunity or money to go out and buy a costume. Client talked about her sister and how she was recently hospitalized and got her gallbladder removed. Client voiced that no one knew that she was in the hospital. Client expressed that her sister was supposed to see the new [employer] movie on Sunday, but she had received a text message from her sister earlier that day, stating that she was not feeling well. Client soon found out that her sister had gone to the hospital later that day and had gallbladder surgery on Monday. Client highlighted that her sister is home now and how she has been in good spirits since. Client had volunteered to come over to help, but after looking at [employer] rates to get there; it was going to cost too much money. Client has been in communication with her sister and her sister reassured her that she had her 's support. Clinician actively listened, provided emotional support, validated client's feelings, addressed and processed her concerns and explored coping skills. During this session, this clinician used the following therapeutic modalities: Client-centered Therapy, Cognitive Behavioral Therapy, and Supportive Psychotherapy    Substance Abuse was not addressed during this session.  If the client is diagnosed with a co-occurring substance use disorder, please indicate any changes in the frequency or amount of use: N/A. Stage of change for addressing substance use diagnoses: No substance use/Not applicable    ASSESSMENT:  Willie Morales presents with a Euthymic/ normal mood. her affect is Normal range and intensity, which is congruent, with her mood and the content of the session. The client has made progress on their goals. Willie Morales presents with a low risk of suicide, low risk of self-harm, and low risk of harm to others. For any risk assessment that surpasses a "low" rating, a safety plan must be developed. A safety plan was indicated: no  If yes, describe in detail N/A    PLAN: Between sessions, Willie Morales will continue to utilize coping skills to manage stress/anxiety. At the next session, the therapist will use Client-centered Therapy, Cognitive Behavioral Therapy, and Supportive Psychotherapy to address anxiety. Behavioral Health Treatment Plan and Discharge Planning: Willie Morales is aware of and agrees to continue to work on their treatment plan. They have identified and are working toward their discharge goals.  yes    Visit start and stop times:    11/02/23  Start Time: 1009  Stop Time: 1100  Total Visit Time: 51 minutes

## 2023-11-16 ENCOUNTER — TELEMEDICINE (OUTPATIENT)
Dept: BEHAVIORAL/MENTAL HEALTH CLINIC | Facility: CLINIC | Age: 58
End: 2023-11-16
Payer: COMMERCIAL

## 2023-11-16 DIAGNOSIS — F33.0 MAJOR DEPRESSIVE DISORDER, RECURRENT, MILD (HCC): ICD-10-CM

## 2023-11-16 DIAGNOSIS — F41.1 GENERALIZED ANXIETY DISORDER: Primary | ICD-10-CM

## 2023-11-16 PROCEDURE — 90834 PSYTX W PT 45 MINUTES: CPT | Performed by: SOCIAL WORKER

## 2023-11-16 NOTE — PSYCH
Virtual Regular Visit    Verification of patient location:    Patient is located at Home in the following state in which I hold an active license Other; [place]      Assessment/Plan:    Problem List Items Addressed This Visit    None  Visit Diagnoses       Generalized anxiety disorder    -  Primary    Major depressive disorder, recurrent, mild ([address])                Goals addressed in session: Goal 1          Reason for visit is   Chief Complaint   Patient presents with    Virtual Regular Visit          Encounter provider Juve Akbar    Provider located at [address] [place] [address]  [phone]      Recent Visits  No visits were found meeting these conditions. Showing recent visits within past 7 days and meeting all other requirements  Today's Visits  Date Type Provider Dept   11/16/23 Telemedicine [facility] Therapist [facility]   Showing today's visits and meeting all other requirements  Future Appointments  No visits were found meeting these conditions. Showing future appointments within next 150 days and meeting all other requirements       The patient was identified by name and date of birth. Gertrudis Franz was informed that this is a telemedicine visit and [facility] She agrees to proceed. .  My office door was closed. No one else was in the room. She acknowledged consent and understanding of privacy and security of the video platform. The patient has agreed to participate and understands they can discontinue the visit at any time. Patient is aware this is a billable service. Subjective  Gertrudis Franz is a 62 y.o. female . HPI     No past medical history on file. No past surgical history on file. No current outpatient medications on file.      No current facility-administered medications for this visit. Not on File    Review of Systems    Video Exam    There were no vitals filed for this visit. Physical Exam     Behavioral Health Psychotherapy Progress Note    Psychotherapy Provided: Individual Psychotherapy     1. Generalized anxiety disorder        2. Major depressive disorder, recurrent, mild ([address])            Goals addressed in session: Goal 1     DATA: Client presented for a telehealth session via [facility]. Client was experiencing technology issues which resulted in a delayed session. Client reported frustrations the last couple days, stating that things were not going accordingly. She acknowledged that she was "sweating the small stuff," but was annoyed in the moment. She talked about needing to do laundry and one of the washers were already occupied, so she could only use the one washer. She had gone to the bank to get a check written out and they had wrote the wrong person's name on it. Client addressed that she got it all figured out though. Client spoke around the upcoming holidays and how she is planning to spend thanksgiving with her one brother. She mentioned that her sister will be spending the holiday with a family friend. Client voiced that she recently found out that her one brother was hospitalized due to a blood clot the week she was supposed to get together with her sister-in-law and sister. Client was happy to note that he is home and doing better. She disclosed that he has to take blood thinners for the rest of his life, but is appreciative for his overall health. Clinician actively listened, provided emotional support, validated client's feelings, addressed and processed client's concerns/frustrations, discussed the theme around time and feeling she does not have enough of it and explored coping skills.    During this session, this clinician used the following therapeutic modalities: Client-centered Therapy, Cognitive Behavioral Therapy, and Supportive Psychotherapy    Substance Abuse was not addressed during this session. If the client is diagnosed with a co-occurring substance use disorder, please indicate any changes in the frequency or amount of use: N/A. Stage of change for addressing substance use diagnoses: No substance use/Not applicable    ASSESSMENT:  Gertrudis Franz presents with a Euthymic/ normal mood. her affect is Normal range and intensity, which is congruent, with her mood and the content of the session. The client has made progress on their goals. Gertrudis Franz presents with a low risk of suicide, low risk of self-harm, and low risk of harm to others. For any risk assessment that surpasses a "low" rating, a safety plan must be developed. A safety plan was indicated: no  If yes, describe in detail N/A    PLAN: Between sessions, Gertrudis Franz will continue to utilize coping skills to manage stress/anxiety. At the next session, the therapist will use Client-centered Therapy, Cognitive Behavioral Therapy, and Supportive Psychotherapy to address anxiety. Behavioral Health Treatment Plan and Discharge Planning: Gertrudis Franz is aware of and agrees to continue to work on their treatment plan. They have identified and are working toward their discharge goals.  yes    Visit start and stop times:    11/16/23  Start Time: 1114  Stop Time: 1200  Total Visit Time: 46 minutes

## 2023-11-22 ENCOUNTER — TELEMEDICINE (OUTPATIENT)
Dept: BEHAVIORAL/MENTAL HEALTH CLINIC | Facility: CLINIC | Age: 58
End: 2023-11-22
Payer: COMMERCIAL

## 2023-11-22 DIAGNOSIS — F41.1 GENERALIZED ANXIETY DISORDER: ICD-10-CM

## 2023-11-22 DIAGNOSIS — F33.0 MAJOR DEPRESSIVE DISORDER, RECURRENT, MILD (HCC): Primary | ICD-10-CM

## 2023-11-22 PROCEDURE — 90837 PSYTX W PT 60 MINUTES: CPT | Performed by: SOCIAL WORKER

## 2023-11-22 NOTE — PSYCH
Virtual Regular Visit    Verification of patient location:    Patient is located at Home in the following state in which I hold an active license Other; [place]      Assessment/Plan:    Problem List Items Addressed This Visit    None  Visit Diagnoses       Major depressive disorder, recurrent, mild ([address])    -  Primary    Generalized anxiety disorder                Goals addressed in session: Goal 1          Reason for visit is   Chief Complaint   Patient presents with    Virtual Regular Visit          Encounter provider Mandie Palmer    Provider located at [id number] [place] [address]  [address] [place], [place] [id number]      Recent Visits  Date Type Provider Dept   11/16/23 Telemedicine [address] [place] [address] Therapist [facility]   Showing recent visits within past 7 days and meeting all other requirements  Today's Visits  Date Type Provider Dept   11/22/23 Telemedicine [address] [place] [address] Therapist [facility]   Showing today's visits and meeting all other requirements  Future Appointments  No visits were found meeting these conditions. Showing future appointments within next 150 days and meeting all other requirements       The patient was identified by name and date of birth. Ananya Sellers was informed that this is a telemedicine visit and [facility] She agrees to proceed. .  My office door was closed. No one else was in the room. She acknowledged consent and understanding of privacy and security of the video platform. The patient has agreed to participate and understands they can discontinue the visit at any time. Patient is aware this is a billable service. Subjective  Ananya Sellers is a 62 y.o. female . HPI     No past medical history on file. No past surgical history on file.     No current outpatient medications on file. No current facility-administered medications for this visit. Not on File    Review of Systems    Video Exam    There were no vitals filed for this visit. Physical Exam     Behavioral Health Psychotherapy Progress Note    Psychotherapy Provided: Individual Psychotherapy     1. Major depressive disorder, recurrent, mild ([address])        2. Generalized anxiety disorder            Goals addressed in session: Goal 1     DATA: Client presented for a telehealth session via [facility]. Client reported that she will be going to her one brother's for Thanksgiving tomorrow. She mentioned that she plans on making mashed potatoes and cookies for the occasion. Client expressed that she would like to begin decorating for the holidays but has to clean and organize her apartment first. Client spoke in-length around the holidays and reminiscing from her childhood. She highlighted that her parents used to take her and the family to the city to watch a light display and walk around [address] She emphasized having fond memories of doing this every year. She is hoping to get other relatives to join her in going to [facility] and walk around [address] and seeing the light displays. She also talked about finances and trying to shop smarter. She addressed maintaining a budget and sticking to the budget. Clinician actively listened, provided emotional support, validated client's feelings, addressed and processed current events and explored coping skills. During this session, this clinician used the following therapeutic modalities: Client-centered Therapy, Cognitive Behavioral Therapy, and Supportive Psychotherapy    Substance Abuse was not addressed during this session. If the client is diagnosed with a co-occurring substance use disorder, please indicate any changes in the frequency or amount of use: N/A.  Stage of change for addressing substance use diagnoses: No substance use/Not applicable    ASSESSMENT:  Ayaan Balderas presents with a Euthymic/ normal mood. her affect is Normal range and intensity, which is congruent, with her mood and the content of the session. The client has made progress on their goals. Ayaan Balderas presents with a low risk of suicide, low risk of self-harm, and low risk of harm to others. For any risk assessment that surpasses a "low" rating, a safety plan must be developed. A safety plan was indicated: no  If yes, describe in detail N/A    PLAN: Between sessions, Ayaan Balderas will continue to utilize coping skills to manage stress/anxiety. At the next session, the therapist will use Client-centered Therapy, Cognitive Behavioral Therapy, and Supportive Psychotherapy to address anxiety. Behavioral Health Treatment Plan and Discharge Planning: Ayaan Balderas is aware of and agrees to continue to work on their treatment plan. They have identified and are working toward their discharge goals.  yes    Visit start and stop times:    11/22/23  Start Time: 1004  Stop Time: 1100  Total Visit Time: 56 minutes

## 2023-12-04 ENCOUNTER — TELEPHONE (OUTPATIENT)
Dept: PSYCHIATRY | Facility: CLINIC | Age: 58
End: 2023-12-04

## 2023-12-04 NOTE — TELEPHONE ENCOUNTER
Left voicemail informing patient of the Virtual Psych Encounter form needing to be signed as a requirement from [employer] for billing purposes. Patient can access form via [employer] and sign electronically. Please make patient aware this form must be signed for each virtual visit as a requirement to continue virtual visits with provider.

## 2023-12-07 ENCOUNTER — TELEMEDICINE (OUTPATIENT)
Dept: BEHAVIORAL/MENTAL HEALTH CLINIC | Facility: CLINIC | Age: 58
End: 2023-12-07
Payer: COMMERCIAL

## 2023-12-07 DIAGNOSIS — F41.1 GENERALIZED ANXIETY DISORDER: Primary | ICD-10-CM

## 2023-12-07 PROCEDURE — 90837 PSYTX W PT 60 MINUTES: CPT | Performed by: SOCIAL WORKER

## 2023-12-07 NOTE — PSYCH
Virtual Regular Visit    Verification of patient location:    Patient is located at Home in the following state in which I hold an active license Other; [place]      Assessment/Plan:    Problem List Items Addressed This Visit    None  Visit Diagnoses       Generalized anxiety disorder    -  Primary            Goals addressed in session: Goal 1          Reason for visit is   Chief Complaint   Patient presents with    Virtual Regular Visit          Encounter provider Navjot Macedo    Provider located at [address] [place] [address]  [address] [place] [address]  [phone]      Recent Visits  Date Type Provider Dept   12/04/23 Telephone [address] recent visits within past 7 days and meeting all other requirements  Today's Visits  Date Type Provider Dept   12/07/23 Telemedicine [facility] Therapist [facility]   Showing today's visits and meeting all other requirements  Future Appointments  No visits were found meeting these conditions. Showing future appointments within next 150 days and meeting all other requirements       The patient was identified by name and date of birth. Blaine Gonzalez was informed that this is a telemedicine visit and [facility] She agrees to proceed. .  My office door was closed. No one else was in the room. She acknowledged consent and understanding of privacy and security of the video platform. The patient has agreed to participate and understands they can discontinue the visit at any time. Patient is aware this is a billable service. Subjective  Blaine Gonzalez is a 62 y.o. female . HPI     No past medical history on file. No past surgical history on file. No current outpatient medications on file.      No current facility-administered medications for this visit. Not on File    Review of Systems    Video Exam    There were no vitals filed for this visit. Physical Exam     Behavioral Health Psychotherapy Progress Note    Psychotherapy Provided: Individual Psychotherapy     1. Generalized anxiety disorder            Goals addressed in session: Goal 1     DATA: Client presented for a telehealth session via [facility]. Client reported that she had a nice Thanksgiving, stating that she had spent it with her brother, sister in-law, nephew and his friends. Client expressed some frustrations with her nephew's friends and how they did not help clean up or offer to clean up. She mentioned that she kept her frustrations to herself. Client voiced that she ended up [place] to her brother's house both ways, noting that no one had offered to pick her  up or take her home, which she thought was odd. She addressed that she did not expect for anyone to do so, but felt it was odd of her family to not offer. She highlighted that she is planning to uber again, and has made arrangements to pickup her rental on Monday. Client is hoping that this will give her more freedom and opportunity to make some money. Client spoke briefly with her sister earlier today, letting her know her plans to [place] again. Client disclosed that her sister sounded disappointed in her for not finding a "real" job. 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 Client is hoping that this will provide supplemental income. Client acknowledged her sister's concern, but emphasized that she needs to do what makes sense to her. Clinician actively listened, provided emotional support, validated client's feelings, addressed and processed current events, discussed what she thinks is best to make extra money and explored coping skills.    During this session, this clinician used the following therapeutic modalities: Client-centered Therapy, Cognitive Behavioral Therapy, and Supportive Psychotherapy    Substance Abuse was not addressed during this session. If the client is diagnosed with a co-occurring substance use disorder, please indicate any changes in the frequency or amount of use: N/A. Stage of change for addressing substance use diagnoses: No substance use/Not applicable    ASSESSMENT:  Sandy Joseph presents with a Euthymic/ normal mood. her affect is Normal range and intensity, which is congruent, with her mood and the content of the session. The client has made progress on their goals. Sandy Joseph presents with a low risk of suicide, low risk of self-harm, and low risk of harm to others. For any risk assessment that surpasses a "low" rating, a safety plan must be developed. A safety plan was indicated: no  If yes, describe in detail N/A    PLAN: Between sessions, Sandy Joseph will continue to utilize coping skills to manage stress/anxiety. At the next session, the therapist will use Client-centered Therapy, Cognitive Behavioral Therapy, and Supportive Psychotherapy to address anxiety. Behavioral Health Treatment Plan and Discharge Planning: Sandy Joseph is aware of and agrees to continue to work on their treatment plan. They have identified and are working toward their discharge goals.  yes    Visit start and stop times:    12/07/23  Start Time: 1004  Stop Time: 1058  Total Visit Time: 54 minutes

## 2023-12-14 ENCOUNTER — TELEMEDICINE (OUTPATIENT)
Dept: BEHAVIORAL/MENTAL HEALTH CLINIC | Facility: CLINIC | Age: 58
End: 2023-12-14
Payer: COMMERCIAL

## 2023-12-14 DIAGNOSIS — F41.1 GENERALIZED ANXIETY DISORDER: Primary | ICD-10-CM

## 2023-12-14 PROCEDURE — 90837 PSYTX W PT 60 MINUTES: CPT | Performed by: SOCIAL WORKER

## 2023-12-14 NOTE — PSYCH
Virtual Regular Visit    Verification of patient location:    Patient is located at Home in the following state in which I hold an active license Other; [place]      Assessment/Plan:    Problem List Items Addressed This Visit    None  Visit Diagnoses       Generalized anxiety disorder    -  Primary            Goals addressed in session: Goal 1          Reason for visit is   Chief Complaint   Patient presents with    Virtual Regular Visit          Encounter provider Socorro Ma    Provider located at [address] [place] [address]  [phone]      Recent Visits  Date Type Provider Dept   12/07/23 Telemedicine [address] [place] [address] Therapist [facility]   Showing recent visits within past 7 days and meeting all other requirements  Today's Visits  Date Type Provider Dept   12/14/23 Telemedicine [address] [place] [address] Therapist [facility]   Showing today's visits and meeting all other requirements  Future Appointments  No visits were found meeting these conditions. Showing future appointments within next 150 days and meeting all other requirements       The patient was identified by name and date of birth. Brian Bruno was informed that this is a telemedicine visit and [facility] She agrees to proceed. .  My office door was closed. No one else was in the room. She acknowledged consent and understanding of privacy and security of the video platform. The patient has agreed to participate and understands they can discontinue the visit at any time. Patient is aware this is a billable service. Subjective  Brian Bruno is a 62 y.o. female . HPI     No past medical history on file. No past surgical history on file. No current outpatient medications on file.      No current facility-administered medications for this visit. Not on File    Review of Systems    Video Exam    There were no vitals filed for this visit. Physical Exam     Behavioral Health Psychotherapy Progress Note    Psychotherapy Provided: Individual Psychotherapy     1. Generalized anxiety disorder            Goals addressed in session: Goal 1     DATA: Client presented for a telehealth session via [facility]. Client reported that she was supposed to pickup her [place] rental on Monday, but she received a notification stating that she would not be able to drive it in [place], since it was registered in [place], [place]. Client expressed that she was frustrated with this, noting that she would not have booked the reservation if she had known. She managed to reschedule a new rental for this afternoon in [place], [place]. Client voiced that she had requested a gas vehicle, but they are releasing a Adriane (electric vehicle) to her. Client is hoping she can switch this out if a gas car is available. In the meantime, client talked about work and how work has been slow. She shared that she has been getting called off of work several times this month. She disclosed feeling annoyed, addressing that she does not find out until two hours prior to her scheduled shift. Client spoke around liking to get errands done in the morning beforehand, but feels this alters her plans. Client talked briefly around the upcoming holiday and how she and her family participate in a white elephant. Clinician actively listened, provided emotional support, validated client's feelings, addressed and processed current events, encouraged client to talk with her boss around her schedule and seeing if her boss could give her more of a heads up if her schedule changes.   During this session, this clinician used the following therapeutic modalities: Client-centered Therapy, Cognitive Behavioral Therapy, and Supportive Psychotherapy    Substance Abuse was not addressed during this session. If the client is diagnosed with a co-occurring substance use disorder, please indicate any changes in the frequency or amount of use: N/A. Stage of change for addressing substance use diagnoses: No substance use/Not applicable    ASSESSMENT:  Josh Nicole presents with a Euthymic/ normal mood. her affect is Normal range and intensity, which is congruent, with her mood and the content of the session. The client has made progress on their goals. Josh Nicole presents with a low risk of suicide, low risk of self-harm, and low risk of harm to others. For any risk assessment that surpasses a "low" rating, a safety plan must be developed. A safety plan was indicated: no  If yes, describe in detail N/A    PLAN: Between sessions, Josh Nicole will continue to utilize coping skills to manage stress/anxiety. At the next session, the therapist will use Client-centered Therapy, Cognitive Behavioral Therapy, and Supportive Psychotherapy to address anxiety. Behavioral Health Treatment Plan and Discharge Planning: Josh Nicole is aware of and agrees to continue to work on their treatment plan. They have identified and are working toward their discharge goals.  yes    Visit start and stop times:    12/14/23  Start Time: 1103  Stop Time: 1158  Total Visit Time: 55 minutes

## 2023-12-28 ENCOUNTER — TELEPHONE (OUTPATIENT)
Dept: PSYCHIATRY | Facility: CLINIC | Age: 58
End: 2023-12-28

## 2023-12-28 NOTE — TELEPHONE ENCOUNTER
Called client to cancel appt. today at 4:00 pm with Evelyn Cooley due to emergency.    Phone # not in service/disconnected.  Sent e-mail message to client.    Next appt. currently scheduled for 1/4 at 10:00 am.

## 2024-01-17 ENCOUNTER — TELEMEDICINE (OUTPATIENT)
Dept: BEHAVIORAL/MENTAL HEALTH CLINIC | Facility: CLINIC | Age: 59
End: 2024-01-17
Payer: COMMERCIAL

## 2024-01-17 DIAGNOSIS — F41.1 GENERALIZED ANXIETY DISORDER: Primary | ICD-10-CM

## 2024-01-17 PROCEDURE — 90837 PSYTX W PT 60 MINUTES: CPT | Performed by: SOCIAL WORKER

## 2024-01-17 NOTE — PSYCH
Virtual Regular Visit    Verification of patient location:    Patient is located at Home in the following state in which I hold an active license Other; [place]      Assessment/Plan:    Problem List Items Addressed This Visit    None  Visit Diagnoses       Generalized anxiety disorder    -  Primary            Goals addressed in session: Goal 1          Reason for visit is   Chief Complaint   Patient presents with    Virtual Regular Visit          Encounter provider Evelyn Cooley    Provider located at [facility] TH[facility]IST [facility]  [facility] THERAPIST M[facility]AL HE[facility] OUTPATIENT  [address] [facility] [address] [phone]      Recent Visits  No visits were found meeting these conditions.  Showing recent visits within past 7 days and meeting all other requirements  Today's Visits  Date Type Provider Dept   01/17/24 Telemedicine Evelyn Cooley [facility] Therapist [facility]   Showing today's visits and meeting all other requirements  Future Appointments  No visits were found meeting these conditions.  Showing future appointments within next 150 days and meeting all other requirements       The patient was identified by name and date of birth. Erika Mendez was informed that this is a telemedicine visit and that the visit is being conducted throughthe Epic Embedded platform. She agrees to proceed..  My office door was closed. No one else was in the room.  She acknowledged consent and understanding of privacy and security of the video platform. The patient has agreed to participate and understands they can discontinue the visit at any time.    Patient is aware this is a billable service.     Subjective  Erika Mendez is a 58 y.o. female .      HPI     No past medical history on file.    No past surgical history on file.    No current outpatient medications on file.     No current facility-administered medications for this visit.        Not on File    Review of  Systems    Video Exam    There were no vitals filed for this visit.    Physical Exam     Behavioral Health Psychotherapy Progress Note    Psychotherapy Provided: Individual Psychotherapy     1. Generalized anxiety disorder            Goals addressed in session: Goal 1     DATA: Client presented for a telehealth session via [employer]. Client reported that she attended her family's holiday party, where she found out that her brother (who is battling cancer) had pneumonia. Client voiced that they still insisted on having the gathering. She mentioned that he looked good overall. Client expressed that he and his family got COVID shortly after the gathering. Client talked about work and how they have been both busy and slow. She noted that they were pretty busy during Cory Andrew Jr. Stating that everyone was out of school. She addressed that she has been Ubering, which has been helpful financially. She is thinking about dropping Fridays from work, voicing that she makes more money Ubering and how she would like to Uber more. Client spoke to her boss about this a while ago, and she wanted to revisit the conversation. Client shared that they recently had their employee holiday party and how much fun she had. She expressed participating in a white elephant gift exchange. She disclosed frustrations with not getting a card or an email that addressed their appreciation for the employees. She acknowledged how the party was their appreciation celebration, but thought a card/email would have been nice to receive. She briefly mentioned things she has gotten in the past during the holidays at other jobs. Clinician actively listened, provided emotional support, validated client's feelings, addressed and processed current events and explored coping skills.   During this session, this clinician used the following therapeutic modalities: Client-centered Therapy, Cognitive Behavioral Therapy, and Supportive  "Psychotherapy    Substance Abuse was not addressed during this session. If the client is diagnosed with a co-occurring substance use disorder, please indicate any changes in the frequency or amount of use: N/A. Stage of change for addressing substance use diagnoses: No substance use/Not applicable    ASSESSMENT:  Erika Mendez presents with a Euthymic/ normal mood.     her affect is Normal range and intensity, which is congruent, with her mood and the content of the session. The client has made progress on their goals.     Erika Mendez presents with a low risk of suicide, low risk of self-harm, and low risk of harm to others.    For any risk assessment that surpasses a \"low\" rating, a safety plan must be developed.    A safety plan was indicated: no  If yes, describe in detail N/A    PLAN: Between sessions, Erika Mendez will continue to utilize coping skills to manage stress/anxiety. At the next session, the therapist will use Client-centered Therapy, Cognitive Behavioral Therapy, and Supportive Psychotherapy to address anxiety.    Behavioral Health Treatment Plan and Discharge Planning: Erika Mendez is aware of and agrees to continue to work on their treatment plan. They have identified and are working toward their discharge goals. yes    Visit start and stop times:    01/17/24  Start Time: 0906  Stop Time: 1000  Total Visit Time: 54 minutes      "

## 2024-01-22 ENCOUNTER — TELEPHONE (OUTPATIENT)
Dept: BEHAVIORAL/MENTAL HEALTH CLINIC | Facility: CLINIC | Age: 59
End: 2024-01-22

## 2024-01-22 NOTE — TELEPHONE ENCOUNTER
Left voicemail informing patient of the Psych Encounter form needing to be signed as a requirement from the insurance company for billing purposes. Patient can access form via [facility] and sign electronically.     Please make patient aware this form must be signed for each visit as a requirement to continue future visits with provider.

## 2024-01-24 ENCOUNTER — TELEMEDICINE (OUTPATIENT)
Dept: BEHAVIORAL/MENTAL HEALTH CLINIC | Facility: CLINIC | Age: 59
End: 2024-01-24
Payer: COMMERCIAL

## 2024-01-24 DIAGNOSIS — F41.1 GENERALIZED ANXIETY DISORDER: Primary | ICD-10-CM

## 2024-01-24 PROCEDURE — 90837 PSYTX W PT 60 MINUTES: CPT | Performed by: SOCIAL WORKER

## 2024-01-24 NOTE — PSYCH
Virtual Regular Visit    Verification of patient location:    Patient is located at Home in the following state in which I hold an active license Other; [place]      Assessment/Plan:    Problem List Items Addressed This Visit    None  Visit Diagnoses       Generalized anxiety disorder    -  Primary            Goals addressed in session: Goal 1          Reason for visit is   Chief Complaint   Patient presents with    Virtual Regular Visit          Encounter provider Evelyn Cooley    Provider located at [facility] TH[facility]APIST [facility]  [facility] THERAPIST MENTAL HEALTH OUTPATIENT  [address] [address] [phone]      Recent Visits  Date Type Provider Dept   01/22/24 Telephone Evelyn Cooley [facility] Therapist op   01/17/24 Telemedicine Evelyn [facility] Therapist op   Showing recent visits within past 7 days and meeting all other requirements  Today's Visits  Date Type Provider Dept   01/24/24 Telemedicine [facility] Therapist op   Showing today's visits and meeting all other requirements  Future Appointments  No visits were found meeting these conditions.  Showing future appointments within next 150 days and meeting all other requirements       The patient was identified by name and date of birth. Erika Mendez was informed that this is a telemedicine visit and that the visit is being conducted throughthe Epic Embedded platform. She agrees to proceed..  My office door was closed. No one else was in the room.  She acknowledged consent and understanding of privacy and security of the video platform. The patient has agreed to participate and understands they can discontinue the visit at any time.    Patient is aware this is a billable service.     Subjective  Erika Mendez is a 58 y.o. female .      HPI     No past medical history on file.    No past surgical history on file.    No current outpatient  medications on file.     No current facility-administered medications for this visit.        Not on File    Review of Systems    Video Exam    There were no vitals filed for this visit.    Physical Exam     Behavioral Health Psychotherapy Progress Note    Psychotherapy Provided: Individual Psychotherapy     1. Generalized anxiety disorder            Goals addressed in session: Goal 1     DATA: Client presented for a telehealth session via Ondeego. Client reported that she decided to return the rental car she had to uber, stating that she was not making enough to keep it. She mentioned that she was not Ubering enough to make it work. She expressed disappointment with this, but stated that she needed to do what made the most sense. 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 Client shared frustrations with this, noting that she is unsure what to do financially. She informed her work that she is no longer Ubering and that she has availability. Client is waiting to see if they will increase her hours. In the meantime, client highlighted that she spent the day with her sister yesterday and how much fun they had. She emphasized that she celebrated her sister's birthday, where she made lunch and gave her sister gifts. She addressed that her sister really enjoyed the lunch she made her. Afterwards, client took her sister to see the new Wonka movie, which they both enjoyed. Client shared that they returned from the movies and chatted some more. Client disclosed that her sister has been having work issues and was able to empathize with her. Client expressed that it was an overall good time. Clinician actively listened, provided emotional support, validated client's feelings, addressed and processed current events, encouraged client to see what work will do regarding her hours, before she makes  "any other decisions, was pleased to hear she had a nice time with her sister and reflected on coping skills.   During this session, this clinician used the following therapeutic modalities: Client-centered Therapy, Cognitive Behavioral Therapy, and Supportive Psychotherapy    Substance Abuse was not addressed during this session. If the client is diagnosed with a co-occurring substance use disorder, please indicate any changes in the frequency or amount of use: N/A. Stage of change for addressing substance use diagnoses: No substance use/Not applicable    ASSESSMENT:  Erika Mendez presents with a Euthymic/ normal mood.     her affect is Normal range and intensity, which is congruent, with her mood and the content of the session. The client has made progress on their goals.     Erika Mendez presents with a low risk of suicide, low risk of self-harm, and low risk of harm to others.    For any risk assessment that surpasses a \"low\" rating, a safety plan must be developed.    A safety plan was indicated: no  If yes, describe in detail N/A    PLAN: Between sessions, Erika Mendez will continue to utilize coping skills to manage stress/anxiety. Will wait to hear back from work before making any other decisions. At the next session, the therapist will use Client-centered Therapy, Cognitive Behavioral Therapy, and Supportive Psychotherapy to address anxiety.    Behavioral Health Treatment Plan and Discharge Planning: Erika Mendez is aware of and agrees to continue to work on their treatment plan. They have identified and are working toward their discharge goals. yes    Visit start and stop times:    01/24/24  Start Time: 0800  Stop Time: 0857  Total Visit Time: 57 minutes        "

## 2024-01-29 ENCOUNTER — TELEPHONE (OUTPATIENT)
Dept: BEHAVIORAL/MENTAL HEALTH CLINIC | Facility: CLINIC | Age: 59
End: 2024-01-29

## 2024-01-29 NOTE — TELEPHONE ENCOUNTER
Left voicemail informing patient of the Psych Encounter form needing to be signed as a requirement from the insurance company for billing purposes. Patient can access form via [employer] and sign electronically.     Please make patient aware this form must be signed for each visit as a requirement to continue future visits with provider.

## 2024-01-31 ENCOUNTER — TELEMEDICINE (OUTPATIENT)
Dept: BEHAVIORAL/MENTAL HEALTH CLINIC | Facility: CLINIC | Age: 59
End: 2024-01-31
Payer: COMMERCIAL

## 2024-01-31 DIAGNOSIS — F32.A DEPRESSION, UNSPECIFIED DEPRESSION TYPE: ICD-10-CM

## 2024-01-31 DIAGNOSIS — F41.1 GENERALIZED ANXIETY DISORDER: Primary | ICD-10-CM

## 2024-01-31 PROCEDURE — 90837 PSYTX W PT 60 MINUTES: CPT | Performed by: SOCIAL WORKER

## 2024-01-31 NOTE — PSYCH
Virtual Regular Visit    Verification of patient location:    Patient is located at Home in the following state in which I hold an active license Other; [place]      Assessment/Plan:    Problem List Items Addressed This Visit    None  Visit Diagnoses       Generalized anxiety disorder    -  Primary    Depression, unspecified depression type                Goals addressed in session: Goal 1          Reason for visit is   Chief Complaint   Patient presents with    Virtual Regular Visit          Encounter provider Evelyn Cooley    Provider located at [facility] THERAPIST [facility]  [facility] THERAPIST MENTAL HEALTH OUTPATIENT  [address] [address] [phone]      Recent Visits  Date Type Provider Dept   01/29/24 Telephone Evelyn Cooley [facility] Therapist op   01/24/24 Telemedicine Evelyn [facility] Therapist op   Showing recent visits within past 7 days and meeting all other requirements  Today's Visits  Date Type Provider Dept   01/31/24 Telemedicine [facility] Therapist op   Showing today's visits and meeting all other requirements  Future Appointments  No visits were found meeting these conditions.  Showing future appointments within next 150 days and meeting all other requirements       The patient was identified by name and date of birth. Erika Mendez was informed that this is a telemedicine visit and that the visit is being conducted throughthe Epic Embedded platform. She agrees to proceed..  My office door was closed. No one else was in the room.  She acknowledged consent and understanding of privacy and security of the video platform. The patient has agreed to participate and understands they can discontinue the visit at any time.    Patient is aware this is a billable service.     Subjective  Erika Mendez is a 58 y.o. female .      HPI     No past medical history on file.    No past  "surgical history on file.    No current outpatient medications on file.     No current facility-administered medications for this visit.        Not on File    Review of Systems    Video Exam    There were no vitals filed for this visit.    Physical Exam     Behavioral Health Psychotherapy Progress Note    Psychotherapy Provided: Individual Psychotherapy     1. Generalized anxiety disorder        2. Depression, unspecified depression type            Goals addressed in session: Goal 1     DATA: Client presented for a telehealth session via FlyData. Client reported experiencing another \"financial crisis.\" She mentioned how embarrassed she feels for getting herself in this situation. 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 Client found out that she is in the deep negatives in her bank account. She had called and they informed her that automatic payments were taken out, even with having insufficient funds. Client is hesitant for reaching out to her family for help, but acknowledged that she can and how they have helped her in the past. Client expressed calling the program that helped her with rental assistance last year, and how they do not have funds this year to support her. Client plans to call 211 to inquire if there are other supports she can connect with. Client spoke around work and how she was called off on Monday. As discussed in previous sessions, this has been a consistent pattern (being scheduled to work three days a week, but only working about one day and getting called off the other two). Client shared that she plans to talk to her boss about this, in hopes that this will increase her hours at work. Clinician actively listened, provided emotional support, validated client's feelings, addressed and processed current events, encouraged client to call her bank to inquire about potential supports they could offer, call 211 and apply for " "rental assistance, apply to jobs (walk around her area and distribute resume to local places), send an email to her siblings letting them know her situation and talk to her boss about her hours at work. Client felt confident with the recommendations provided.   During this session, this clinician used the following therapeutic modalities: Client-centered Therapy, Cognitive Behavioral Therapy, and Supportive Psychotherapy    Substance Abuse was not addressed during this session. If the client is diagnosed with a co-occurring substance use disorder, please indicate any changes in the frequency or amount of use: N/A. Stage of change for addressing substance use diagnoses: No substance use/Not applicable    ASSESSMENT:  Erika Mendez presents with a Euthymic/ normal mood.     her affect is Normal range and intensity, which is congruent, with her mood and the content of the session. The client has made progress on their goals.     Erika Mendez presents with a low risk of suicide, low risk of self-harm, and low risk of harm to others.    For any risk assessment that surpasses a \"low\" rating, a safety plan must be developed.    A safety plan was indicated: no  If yes, describe in detail N/A    PLAN: Between sessions, Erika Mendez will continue to utilize coping skills to manage stress/anxiety. Will call, email and talk with identified people discussed. At the next session, the therapist will use Client-centered Therapy, Cognitive Behavioral Therapy, and Supportive Psychotherapy to address anxiety.    Behavioral Health Treatment Plan and Discharge Planning: Erika Mendez is aware of and agrees to continue to work on their treatment plan. They have identified and are working toward their discharge goals. yes    Visit start and stop times:    01/31/24  Start Time: 0804  Stop Time: 0857  Total Visit Time: 53 minutes      "

## 2024-02-07 ENCOUNTER — TELEMEDICINE (OUTPATIENT)
Dept: BEHAVIORAL/MENTAL HEALTH CLINIC | Facility: CLINIC | Age: 59
End: 2024-02-07
Payer: COMMERCIAL

## 2024-02-07 DIAGNOSIS — F41.1 GENERALIZED ANXIETY DISORDER: Primary | ICD-10-CM

## 2024-02-07 PROCEDURE — 90834 PSYTX W PT 45 MINUTES: CPT | Performed by: SOCIAL WORKER

## 2024-02-07 NOTE — PSYCH
Virtual Regular Visit    Verification of patient location:    Patient is located at Home in the following state in which I hold an active license Other; [place]      Assessment/Plan:    Problem List Items Addressed This Visit    None  Visit Diagnoses       Generalized anxiety disorder    -  Primary            Goals addressed in session: Goal 1          Reason for visit is   Chief Complaint   Patient presents with    Virtual Regular Visit          Encounter provider Evelyn Cooley    Provider located at [facility] THERAPIST [facility]  [facility] THERAPIST MENTAL HEALTH OUTPATIENT  [address] [address]  [phone]      Recent Visits  Date Type Provider Dept   01/31/24 Telemedicine Evelyn Cooley [facility] Therapist [facility]   Showing recent visits within past 7 days and meeting all other requirements  Today's Visits  Date Type Provider Dept   02/07/24 Telemedicine Evelyn [facility] Therapist [facility]   Showing today's visits and meeting all other requirements  Future Appointments  No visits were found meeting these conditions.  Showing future appointments within next 150 days and meeting all other requirements       The patient was identified by name and date of birth. Erika Mendez was informed that this is a telemedicine visit and that the visit is being conducted throughthe Epic Embedded platform. She agrees to proceed..  My office door was closed. No one else was in the room.  She acknowledged consent and understanding of privacy and security of the video platform. The patient has agreed to participate and understands they can discontinue the visit at any time.    Patient is aware this is a billable service.     Subjective  Erika Mendez is a 59 y.o. female .      HPI     No past medical history on file.    No past surgical history on file.    No current outpatient medications on file.     No current facility-administered  medications for this visit.        Not on File    Review of Systems    Video Exam    There were no vitals filed for this visit.    Physical Exam     Behavioral Health Psychotherapy Progress Note    Psychotherapy Provided: Individual Psychotherapy     1. Generalized anxiety disorder            Goals addressed in session: Goal 1     DATA: Client presented for a telehealth session via Dada. Client reported that her 59th birthday is today and how she did not have much planned. She mentioned that she was in correspondence with her sister to come up with a day/time that works for them to get together and celebrate. Client shared that her family do not do much for birthdays, besides a text or phone call and how it is really her and her sister who do something. Client spoke in-length around February and how February was one of her favorite months, although not so much anymore. She noted that February was her birthday month, the month she got  etc. But it also became the month where her father  and other negative events took place. Client shared that she ended up not reaching out to her family via email regarding her financial situation. She disclosed not wanting to burden anyone or cause issues.  Client reached out to a local service for potential support ([facility]). Client noted that she recently found out that her brother, Roverto is not doing well. She spoke to him on the phone inquiring if she could get a ride from him for her doctors appointment this afternoon. Client disclosed that she felt bad asking him, after hearing what he was going through. Client was reassured and encouraged by her brother that this will help him get out of the house and allow for brother/sister bonding. Clinician actively listened, provided emotional support, validated client's feelings, addressed and processed current events, discussed other financial supports, encouraged client to enjoy her time with  "brother and explored coping skills.  During this session, this clinician used the following therapeutic modalities: Client-centered Therapy, Cognitive Behavioral Therapy, and Supportive Psychotherapy    Substance Abuse was not addressed during this session. If the client is diagnosed with a co-occurring substance use disorder, please indicate any changes in the frequency or amount of use: N/A. Stage of change for addressing substance use diagnoses: No substance use/Not applicable    ASSESSMENT:  Erika Mendez presents with a Euthymic/ normal mood.     her affect is Normal range and intensity, which is congruent, with her mood and the content of the session. The client has made progress on their goals.     Erika Mendez presents with a low risk of suicide, low risk of self-harm, and low risk of harm to others.    For any risk assessment that surpasses a \"low\" rating, a safety plan must be developed.    A safety plan was indicated: no  If yes, describe in detail N/A    PLAN: Between sessions, Erika Mendez will continue to utilize coping skills to manage stress/anxiety. Will enjoy quality time with brother. At the next session, the therapist will use Client-centered Therapy, Cognitive Behavioral Therapy, and Supportive Psychotherapy to address anxiety.    Behavioral Health Treatment Plan and Discharge Planning: Erika Mendez is aware of and agrees to continue to work on their treatment plan. They have identified and are working toward their discharge goals. yes    Visit start and stop times:    02/07/24  Start Time: 0904  Stop Time: 0952  Total Visit Time: 48 minutes    "

## 2024-02-21 ENCOUNTER — TELEMEDICINE (OUTPATIENT)
Dept: BEHAVIORAL/MENTAL HEALTH CLINIC | Facility: CLINIC | Age: 59
End: 2024-02-21
Payer: COMMERCIAL

## 2024-02-21 DIAGNOSIS — F41.1 GENERALIZED ANXIETY DISORDER: Primary | ICD-10-CM

## 2024-02-21 PROCEDURE — 90837 PSYTX W PT 60 MINUTES: CPT | Performed by: SOCIAL WORKER

## 2024-02-21 NOTE — PSYCH
Virtual Regular Visit    Verification of patient location:    Patient is located at Home in the following state in which I hold an active license Other; [place]      Assessment/Plan:    Problem List Items Addressed This Visit    None  Visit Diagnoses       Generalized anxiety disorder    -  Primary            Goals addressed in session: Goal 1          Reason for visit is   Chief Complaint   Patient presents with    Virtual Regular Visit          Encounter provider Evelyn Cooley    Provider located at [facility] THERAPIST [facility]  [facility] THERAPIST MENTAL HEALTH OUTPATIENT  [address] [address] [phone]      Recent Visits  No visits were found meeting these conditions.  Showing recent visits within past 7 days and meeting all other requirements  Today's Visits  Date Type Provider Dept   02/21/24 Telemedicine Evelyn Cooley [facility] Therapist [facility]   Showing today's visits and meeting all other requirements  Future Appointments  No visits were found meeting these conditions.  Showing future appointments within next 150 days and meeting all other requirements       The patient was identified by name and date of birth. Erika Mendez was informed that this is a telemedicine visit and that the visit is being conducted throughthe Epic Embedded platform. She agrees to proceed..  My office door was closed. No one else was in the room.  She acknowledged consent and understanding of privacy and security of the video platform. The patient has agreed to participate and understands they can discontinue the visit at any time.    Patient is aware this is a billable service.     Subjective  Erika Mendez is a 59 y.o. female .      HPI     No past medical history on file.    No past surgical history on file.    No current outpatient medications on file.     No current facility-administered medications for this visit.        Not on File    Review of  Systems    Video Exam    There were no vitals filed for this visit.    Physical Exam     Behavioral Health Psychotherapy Progress Note    Psychotherapy Provided: Individual Psychotherapy     1. Generalized anxiety disorder            Goals addressed in session: Goal 1     DATA: Client presented for a telehealth session via BRAIN. Client reported that her brother is not doing well. She mentioned that he had taken her to one of her appointments and how he was not doing well then. She shared that he has been in and out of the hospital since and has had a couple procedures. Client expressed that he is very sick and is unsure how much time he has left. Client addressed feeling bad/guilty that she cannot go visit him, due to not having a car and not wanting to burden other family members. Client mentioned that her landlord wants to evict her due to being late on rent. He has not gone through with the eviction process. 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 Client met with the [place] [employer] and spoke to someone who provided jobs and other resources. She emphasized that she already applied to a couple of them. She expressed that they are of walking distance. Client talked about work and how her hours keep getting cut. She disclosed that she was supposed to work five days last week (picking up a couple shifts from someone else), but only ended up working one day a week. Client shared that she cannot live off of working five to ten hours in a two week period. Clinician actively listened, provided emotional support, validated client's feelings, addressed and processed current events, discussed what is within her control and that she is doing enough to support her brother, encouraged client to advocate for herself when it comes to her work, continue to apply for jobs and explored coping skills.   During this session, this clinician used the following therapeutic modalities:  "Client-centered Therapy, Cognitive Behavioral Therapy, and Supportive Psychotherapy    Substance Abuse was not addressed during this session. If the client is diagnosed with a co-occurring substance use disorder, please indicate any changes in the frequency or amount of use: N/A. Stage of change for addressing substance use diagnoses: No substance use/Not applicable    ASSESSMENT:  Erika Mendez presents with a Euthymic/ normal mood.     her affect is Normal range and intensity, which is congruent, with her mood and the content of the session. The client has made progress on their goals.     Erika Mendez presents with a low risk of suicide, low risk of self-harm, and low risk of harm to others.    For any risk assessment that surpasses a \"low\" rating, a safety plan must be developed.    A safety plan was indicated: no  If yes, describe in detail N/A    PLAN: Between sessions, Erika Mendez will continue to utilize coping skills to manage stress/anxiety. At the next session, the therapist will use Client-centered Therapy, Cognitive Behavioral Therapy, and Supportive Psychotherapy to address anxiety.    Behavioral Health Treatment Plan and Discharge Planning: Erika Mendez is aware of and agrees to continue to work on their treatment plan. They have identified and are working toward their discharge goals. yes    Visit start and stop times:    02/21/24  Start Time: 1005  Stop Time: 1100  Total Visit Time: 55 minutes        "

## 2024-03-06 ENCOUNTER — TELEMEDICINE (OUTPATIENT)
Dept: BEHAVIORAL/MENTAL HEALTH CLINIC | Facility: CLINIC | Age: 59
End: 2024-03-06
Payer: COMMERCIAL

## 2024-03-06 DIAGNOSIS — F41.1 GENERALIZED ANXIETY DISORDER: Primary | ICD-10-CM

## 2024-03-06 PROCEDURE — 90834 PSYTX W PT 45 MINUTES: CPT | Performed by: SOCIAL WORKER

## 2024-03-06 NOTE — PSYCH
Virtual Regular Visit    Verification of patient location:    Patient is located at Home in the following state in which I hold an active license Other; [place] towards [place] lic[place]      Assessment/Plan:    Problem List Items Addressed This Visit    None  Visit Diagnoses       Generalized anxiety disorder    -  Primary            Goals addressed in session: Goal 1          Reason for visit is   Chief Complaint   Patient presents with    Virtual Regular Visit          Encounter provider Evelyn Cooley    Provider located at [facility] THERAPIST [facility]  [facility] THERAPIST MENTAL HEALTH OUTPATIENT  [address] [id number] [phone]      Recent Visits  No visits were found meeting these conditions.  Showing recent visits within past 7 days and meeting all other requirements  Today's Visits  Date Type Provider Dept   03/06/24 Telemedicine Evelyn Cooley [facility] Therapist [facility]   Showing today's visits and meeting all other requirements  Future Appointments  No visits were found meeting these conditions.  Showing future appointments within next 150 days and meeting all other requirements       The patient was identified by name and date of birth. Erika Mendez was informed that this is a telemedicine visit and that the visit is being conducted throughthe Epic Embedded platform. She agrees to proceed..  My office door was closed. No one else was in the room.  She acknowledged consent and understanding of privacy and security of the video platform. The patient has agreed to participate and understands they can discontinue the visit at any time.    Patient is aware this is a billable service.     Subjective  Erika Mendez is a 59 y.o. female .      HPI     No past medical history on file.    No past surgical history on file.    No current outpatient medications on file.     No current facility-administered medications for this visit.        Not on File    Review of  Systems    Video Exam    There were no vitals filed for this visit.    Physical Exam     Behavioral Health Psychotherapy Progress Note    Psychotherapy Provided: Individual Psychotherapy     1. Generalized anxiety disorder            Goals addressed in session: Goal 1     DATA: Client presented for a telehealth session via [employer]. Client reported fears related to the upcoming election. She shared not knowing what is going to happen and the outcome to government assistance. She expressed ongoing struggles with finances and not being able to pay her rent. She voiced that her landlord has started the eviction process. Client addressed that she spoke to her boss at work inquiring an increase in hours and disclosing her current situation. 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 She mentioned that her boss seemed to be really understanding of her situation and wanting to accommodate as much as possible. Client managed to receive an advance in her annuity, which will provide her some funds. Client shared that she continues to look and apply for another part-time job. Client briefly talked about her brother and his declining health. She voiced that he continues to be positive and in good spirits. She noted that her sister-in-law has been handling this well. Client plans to call him later today to check-in. She noted that she tries to check-in often, stating concerns she has for him. Clinician actively listened, provided emotional support, validated client's feelings, addressed and processed current events and explored coping skills.   During this session, this clinician used the following therapeutic modalities: Client-centered Therapy, Cognitive Behavioral Therapy, and Supportive Psychotherapy    Substance Abuse was not addressed during this session. If the client is  "diagnosed with a co-occurring substance use disorder, please indicate any changes in the frequency or amount of use: N/A. Stage of change for addressing substance use diagnoses: No substance use/Not applicable    ASSESSMENT:  Erika Mendez presents with a Euthymic/ normal mood.     her affect is Normal range and intensity, which is congruent, with her mood and the content of the session. The client has made progress on their goals.     Erika Mendez presents with a low risk of suicide, low risk of self-harm, and low risk of harm to others.    For any risk assessment that surpasses a \"low\" rating, a safety plan must be developed.    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 At the next session, the therapist will use Client-centered Therapy, Cognitive Behavioral Therapy, and Supportive Psychotherapy to address anxiety.    Behavioral Health Treatment Plan and Discharge Planning: Erika Mendez is aware of and agrees to continue to work on their treatment plan. They have identified and are working toward their discharge goals. yes    Visit start and stop times:    03/06/24  Start Time: 1002  Stop Time: 1053  Total Visit Time: 51 minutes        "

## 2024-05-30 ENCOUNTER — TELEMEDICINE (OUTPATIENT)
Dept: BEHAVIORAL/MENTAL HEALTH CLINIC | Facility: CLINIC | Age: 59
End: 2024-05-30
Payer: COMMERCIAL

## 2024-05-30 DIAGNOSIS — F41.1 GENERALIZED ANXIETY DISORDER: Primary | ICD-10-CM

## 2024-05-30 PROCEDURE — 90834 PSYTX W PT 45 MINUTES: CPT | Performed by: SOCIAL WORKER

## 2024-05-30 NOTE — PSYCH
Virtual Regular Visit    Verification of patient location:    Patient is located at Home in the following state in which I hold an active license Other; [place]      Assessment/Plan:    Problem List Items Addressed This Visit    None  Visit Diagnoses       Generalized anxiety disorder    -  Primary            Goals addressed in session: Goal 1          Reason for visit is   Chief Complaint   Patient presents with    Virtual Regular Visit          Encounter provider Evelyn Cooley      Recent Visits  No visits were found meeting these conditions.  Showing recent visits within past 7 days and meeting all other requirements  Today's Visits  Date Type Provider Dept   05/30/24 Telemedicine Evelyn Cooley [facility] Therapist [facility]   Showing today's visits and meeting all other requirements  Future Appointments  No visits were found meeting these conditions.  Showing future appointments within next 150 days and meeting all other requirements       The patient was identified by name and date of birth. Erika Mendez was informed that this is a telemedicine visit and that the visit is being conducted throughthe Epic Embedded platform. She agrees to proceed..  My office door was closed. No one else was in the room.  She acknowledged consent and understanding of privacy and security of the video platform. The patient has agreed to participate and understands they can discontinue the visit at any time.    Patient is aware this is a billable service.     Subjective  Erika Mendez is a 59 y.o. female  .      HPI     No past medical history on file.    No past surgical history on file.    No current outpatient medications on file.     No current facility-administered medications for this visit.        Not on File    Review of Systems    Video Exam    There were no vitals filed for this visit.    Physical Exam     Behavioral Health Psychotherapy Progress Note    Psychotherapy Provided:  "Individual Psychotherapy     1. Generalized anxiety disorder            Goals addressed in session: Goal 1     DATA: Client presented for a telehealth session via [employer]. Client reported that she was having issues with the link, which resulted in a delayed start time. Client shared that \"a lot has happened, but nothing changed.\" She expressed that she had gone to eviction court and how her landlord was nice about it. She mentioned that he explained the process to her. Client voiced that she was able to get approved for rental assistance days before they were going to proceed with the eviction. She noted that she had reached out to her family via email to explain the situation to them. As predicted, client's family expressed their disappointment in the situation and how they were unable to support her financially or provide her a space for her to stay until she found something else. Client highlighted how grateful she was to have found financial support right when she needed it. Client addressed that work is going the same; hours are still getting cut or she is called out of work. Client shared that she needs to find work that is more consistent, but needs to be local since she does not have means of transportation. Client plans to continue to talk to work about increasing her hours until she can find something more consistent. Clinician actively listened, provided emotional support, validated client's feeling, addressed and processed current events and encouraged client to utilize coping skills.   During this session, this clinician used the following therapeutic modalities: Client-centered Therapy, Cognitive Behavioral Therapy, and Supportive Psychotherapy    Substance Abuse was not addressed during this session. If the client is diagnosed with a co-occurring substance use disorder, please indicate any changes in the frequency or amount of use: N/A. Stage of change for addressing substance use diagnoses: No " "substance use/Not applicable    ASSESSMENT:  Erika Mendez presents with a Euthymic/ normal mood.     her affect is Normal range and intensity, which is congruent, with her mood and the content of the session. The client has made progress on their goals.     Erika Mendez presents with a low risk of suicide, low risk of self-harm, and low risk of harm to others.    For any risk assessment that surpasses a \"low\" rating, a safety plan must be developed.    A safety plan was indicated: no  If yes, describe in detail N/A    PLAN: Between sessions, Erika Mendez will continue to utilize coping skills to manage stress/anxiety. At the next session, the therapist will use Client-centered Therapy, Cognitive Behavioral Therapy, and Supportive Psychotherapy to address anxiety.    Behavioral Health Treatment Plan and Discharge Planning: Erika Mendez is aware of and agrees to continue to work on their treatment plan. They have identified and are working toward their discharge goals. yes    Visit start and stop times:    05/30/24  Start Time: 0921  Stop Time: 1000  Total Visit Time: 39 minutes        "

## 2024-06-04 ENCOUNTER — TELEPHONE (OUTPATIENT)
Dept: PSYCHIATRY | Facility: CLINIC | Age: 59
End: 2024-06-04

## 2024-06-04 NOTE — TELEPHONE ENCOUNTER
Left voicemail informing patient and/or parent/guardian of the Psych Encounter form needing to be signed as a requirement from the insurance company for billing purposes. Patient can access form via [employer] and sign electronically.     Please make patient aware this form must be signed for each visit as a requirement to continue future visits with provider.

## 2024-06-13 ENCOUNTER — TELEMEDICINE (OUTPATIENT)
Dept: BEHAVIORAL/MENTAL HEALTH CLINIC | Facility: CLINIC | Age: 59
End: 2024-06-13
Payer: COMMERCIAL

## 2024-06-13 DIAGNOSIS — F41.1 GENERALIZED ANXIETY DISORDER: Primary | ICD-10-CM

## 2024-06-13 PROCEDURE — 90834 PSYTX W PT 45 MINUTES: CPT | Performed by: SOCIAL WORKER

## 2024-06-13 NOTE — PSYCH
Virtual Regular Visit    Verification of patient location:    Patient is located at Home in the following state in which I hold an active license [place]      Assessment/Plan:    Problem List Items Addressed This Visit    None  Visit Diagnoses       Generalized anxiety disorder    -  Primary            Goals addressed in session: Goal 1          Reason for visit is   Chief Complaint   Patient presents with    Virtual Regular Visit          Encounter provider Evelyn Cooley      Recent Visits  No visits were found meeting these conditions.  Showing recent visits within past 7 days and meeting all other requirements  Today's Visits  Date Type Provider Dept   06/13/24 Telemedicine Evelyn Cooley [facility] Therapist [facility]   Showing today's visits and meeting all other requirements  Future Appointments  No visits were found meeting these conditions.  Showing future appointments within next 150 days and meeting all other requirements       The patient was identified by name and date of birth. Erika Mendez was informed that this is a telemedicine visit and that the visit is being conducted throughthe Epic Embedded platform. She agrees to proceed..  My office door was closed. No one else was in the room.  She acknowledged consent and understanding of privacy and security of the video platform. The patient has agreed to participate and understands they can discontinue the visit at any time.    Patient is aware this is a billable service.     Subjective  Erika Mendez is a 59 y.o. female .      HPI     No past medical history on file.    No past surgical history on file.    No current outpatient medications on file.     No current facility-administered medications for this visit.        Not on File    Review of Systems    Video Exam    There were no vitals filed for this visit.    Physical Exam     Behavioral Health Psychotherapy Progress Note    Psychotherapy Provided: Individual Psychotherapy     1. Generalized anxiety  disorder            Goals addressed in session: Goal 1     DATA: Client presented for a telehealth session via [email or web address]. Client reported that she has been having a hard time sleeping. 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 Client addressed that the person who was in charge of scheduling had resigned, voicing that they have been dealing with medical issues that need to be taken care of. She expressed that someone else had taken over scheduling. Client talked about having limited contact with her family, noting that she has reached out a couple times to her sister and one brother, but has not heard back from her brother and has had minimal conversations with her sister. Client mentioned that they are still upset with her around her financial situation. Client is hopeful that now that hours have been increasing; she will begin having nicer paychecks that she can pay towards bills and other important expenditures. Clinician actively listened, provided emotional support, validated client's feelings, addressed and processed current events, discussed work and being mindful towards how much she can do (without overdoing it), continue to make outreaches with her siblings and utilize coping skills when necessary.  During this session, this clinician used the following therapeutic modalities: Client-centered Therapy, Cognitive Behavioral Therapy, and Supportive Psychotherapy    Substance Abuse was not addressed during this session. If the client is diagnosed with a co-occurring substance use disorder, please indicate any changes in the frequency or amount of use: N/A. Stage of change for addressing substance use diagnoses: No substance use/Not applicable    ASSESSMENT:  Erika Mendez presents with a Euthymic/ normal mood.     her affect is Normal range and intensity, which is congruent, with her mood and the content of the session.  "The client has made progress on their goals.     Erika Mendez presents with a low risk of suicide, low risk of self-harm, and low risk of harm to others.    For any risk assessment that surpasses a \"low\" rating, a safety plan must be developed.    A safety plan was indicated: no  If yes, describe in detail N/A    PLAN: Between sessions, Erika Mendez will continue to utilize coping skills to manage stress/anxiety, will pickup shifts at work. At the next session, the therapist will use Client-centered Therapy, Cognitive Behavioral Therapy, and Supportive Psychotherapy to address N/A.    Behavioral Health Treatment Plan and Discharge Planning: Erika Mendez is aware of and agrees to continue to work on their treatment plan. They have identified and are working toward their discharge goals. no    Visit start and stop times:    06/13/24  Start Time: 0908  Stop Time: 0959  Total Visit Time: 51 minutes        "

## 2024-06-27 ENCOUNTER — TELEMEDICINE (OUTPATIENT)
Dept: BEHAVIORAL/MENTAL HEALTH CLINIC | Facility: CLINIC | Age: 59
End: 2024-06-27
Payer: COMMERCIAL

## 2024-06-27 DIAGNOSIS — F41.1 GENERALIZED ANXIETY DISORDER: Primary | ICD-10-CM

## 2024-06-27 PROCEDURE — 90832 PSYTX W PT 30 MINUTES: CPT | Performed by: SOCIAL WORKER

## 2024-09-24 ENCOUNTER — DOCUMENTATION (OUTPATIENT)
Dept: BEHAVIORAL/MENTAL HEALTH CLINIC | Facility: CLINIC | Age: 59
End: 2024-09-24

## 2024-09-24 NOTE — PROGRESS NOTES
Psychotherapy Discharge Summary    Preferred Name: Erika Mendez  YOB: 1965    Admission date to psychotherapy: 12/2022    Referred by: Self    Presenting Problem: Anxiety    Course of treatment included : individual therapy     Progress/Outcome of Treatment Goals (brief summary of course of treatment) Managing anxiety symptoms    Treatment Complications (if any): N/A    Treatment Progress: fair    Current [facility] Psychiatric Provider: N/A    Discharge Medications include: N/A    Discharge Date: 9/24/2024    Discharge Diagnosis: No diagnosis found.    Criteria for Discharge: demonstrated failure to uphold their treatment plan/contract    Aftercare recommendations include (include specific referral names and phone numbers, if appropriate): Return to [facility] if situation changes    Prognosis: fair

## 2024-10-28 ENCOUNTER — TELEPHONE (OUTPATIENT)
Age: 59
End: 2024-10-28

## 2024-10-28 NOTE — TELEPHONE ENCOUNTER
Wait List - TT    Erika's , Ana Laura at [facility] called to see if they can get her back on Talk Therapy. They would prefer Virtual appointments. Erika was just D/C from  in [place] in June from Talk Therapy.    Erika has Psychiatric services at the Nursing facility due to being on Psych medication but is in need of Talk Therapy. She would prefer female Therapist.    Please contact Ana Laura for the appointments and any information. She provided her phone number which is starred on file.

## 2025-02-22 ENCOUNTER — HOSPITAL ENCOUNTER (EMERGENCY)
Dept: HOSPITAL 99 - EMR | Age: 60
Discharge: HOME | End: 2025-02-22
Payer: MEDICAID

## 2025-02-22 VITALS — RESPIRATION RATE: 26 BRPM

## 2025-02-22 VITALS — SYSTOLIC BLOOD PRESSURE: 178 MMHG | DIASTOLIC BLOOD PRESSURE: 69 MMHG | RESPIRATION RATE: 16 BRPM

## 2025-02-22 VITALS — RESPIRATION RATE: 16 BRPM

## 2025-02-22 VITALS — SYSTOLIC BLOOD PRESSURE: 147 MMHG | DIASTOLIC BLOOD PRESSURE: 67 MMHG | RESPIRATION RATE: 16 BRPM

## 2025-02-22 VITALS — DIASTOLIC BLOOD PRESSURE: 85 MMHG | RESPIRATION RATE: 28 BRPM | SYSTOLIC BLOOD PRESSURE: 142 MMHG

## 2025-02-22 VITALS — BODY MASS INDEX: 32.4 KG/M2

## 2025-02-22 DIAGNOSIS — Z91.51: ICD-10-CM

## 2025-02-22 DIAGNOSIS — E11.22: ICD-10-CM

## 2025-02-22 DIAGNOSIS — K21.9: ICD-10-CM

## 2025-02-22 DIAGNOSIS — K29.70: Primary | ICD-10-CM

## 2025-02-22 DIAGNOSIS — G89.29: ICD-10-CM

## 2025-02-22 DIAGNOSIS — J18.9: ICD-10-CM

## 2025-02-22 DIAGNOSIS — M54.50: ICD-10-CM

## 2025-02-22 DIAGNOSIS — Z99.2: ICD-10-CM

## 2025-02-22 DIAGNOSIS — N18.6: ICD-10-CM

## 2025-02-22 LAB
ALBUMIN SERPL-MCNC: 4.1 G/DL (ref 3.5–5)
ALP SERPL-CCNC: 123 U/L (ref 38–126)
ALT SERPL-CCNC: 30 U/L (ref 0–35)
AST SERPL-CCNC: 25 U/L (ref 14–36)
BUN SERPL-MCNC: 42 MG/DL (ref 7–17)
CALCIUM SERPL-MCNC: 9.6 MG/DL (ref 8.4–10.2)
CHLORIDE SERPL-SCNC: 109 MMOL/L (ref 98–107)
CO2 SERPL-SCNC: 15 MMOL/L (ref 22–30)
EGFR: 9.35
ERYTHROCYTE [DISTWIDTH] IN BLOOD BY AUTOMATED COUNT: 13 % (ref 11.5–14.5)
ESTIMATED CREATININE CLEARANCE: 13 ML/MIN
GLUCOSE SERPL-MCNC: 121 MG/DL (ref 70–99)
HCT VFR BLD AUTO: 30 % (ref 37–47)
HGB BLD-MCNC: 10.5 G/DL (ref 12–16)
LIPASE SERPL-CCNC: 231 U/L (ref 23–300)
MCHC RBC AUTO-ENTMCNC: 35 G/DL (ref 33–37)
MCV RBC AUTO: 88.5 FL (ref 81–99)
NRBC BLD AUTO-RTO: 0 %
PLATELET # BLD AUTO: 218 10^3/UL (ref 130–400)
POTASSIUM SERPL-SCNC: 4.3 MMOL/L (ref 3.5–5.1)
PROT SERPL-MCNC: 6.8 G/DL (ref 6.3–8.2)
SODIUM SERPL-SCNC: 137 MMOL/L (ref 135–145)
TROPONIN I SERPL-MCNC: < 0.012 NG/ML

## 2025-02-22 PROCEDURE — 99285 EMERGENCY DEPT VISIT HI MDM: CPT

## 2025-02-22 PROCEDURE — 96374 THER/PROPH/DIAG INJ IV PUSH: CPT

## 2025-02-22 PROCEDURE — 96375 TX/PRO/DX INJ NEW DRUG ADDON: CPT

## 2025-02-22 RX ADMIN — FAMOTIDINE 20 MG: 20 TABLET, FILM COATED ORAL at 15:13

## 2025-02-22 RX ADMIN — IOHEXOL 50 ML: 240 INJECTION, SOLUTION INTRATHECAL; INTRAVASCULAR; INTRAVENOUS; ORAL at 10:47

## 2025-02-22 RX ADMIN — OXYCODONE AND ACETAMINOPHEN 1 TABLET: 325; 5 TABLET ORAL at 15:13

## 2025-02-22 RX ADMIN — HYDROMORPHONE HYDROCHLORIDE 0.25 MG: 0.25 INJECTION, SOLUTION INTRAMUSCULAR; INTRAVENOUS; SUBCUTANEOUS at 10:39

## 2025-02-22 RX ADMIN — ONDANSETRON HYDROCHLORIDE 4 MG: 2 SOLUTION INTRAMUSCULAR; INTRAVENOUS at 10:39

## 2025-03-03 ENCOUNTER — HOSPITAL ENCOUNTER (EMERGENCY)
Dept: HOSPITAL 99 - EMR | Age: 60
Discharge: NURSING, REHAB OR LONG-TERM CARE | End: 2025-03-03
Payer: COMMERCIAL

## 2025-03-03 VITALS — SYSTOLIC BLOOD PRESSURE: 154 MMHG | DIASTOLIC BLOOD PRESSURE: 68 MMHG | RESPIRATION RATE: 18 BRPM

## 2025-03-03 VITALS — SYSTOLIC BLOOD PRESSURE: 143 MMHG | RESPIRATION RATE: 16 BRPM | DIASTOLIC BLOOD PRESSURE: 80 MMHG

## 2025-03-03 VITALS — DIASTOLIC BLOOD PRESSURE: 68 MMHG | SYSTOLIC BLOOD PRESSURE: 162 MMHG | RESPIRATION RATE: 18 BRPM

## 2025-03-03 VITALS — DIASTOLIC BLOOD PRESSURE: 79 MMHG | RESPIRATION RATE: 18 BRPM | SYSTOLIC BLOOD PRESSURE: 182 MMHG

## 2025-03-03 DIAGNOSIS — Z91.51: ICD-10-CM

## 2025-03-03 DIAGNOSIS — N18.9: ICD-10-CM

## 2025-03-03 DIAGNOSIS — E11.9: ICD-10-CM

## 2025-03-03 DIAGNOSIS — I50.9: ICD-10-CM

## 2025-03-03 DIAGNOSIS — Z99.2: Primary | ICD-10-CM

## 2025-03-03 LAB
ALBUMIN SERPL-MCNC: 4.2 G/DL (ref 3.5–5)
ALP SERPL-CCNC: 109 U/L (ref 38–126)
ALT SERPL-CCNC: 18 U/L (ref 0–35)
AST SERPL-CCNC: 23 U/L (ref 14–36)
BUN SERPL-MCNC: 69 MG/DL (ref 7–17)
CALCIUM SERPL-MCNC: 9.3 MG/DL (ref 8.4–10.2)
CHLORIDE SERPL-SCNC: 107 MMOL/L (ref 98–107)
CO2 SERPL-SCNC: 15 MMOL/L (ref 22–30)
EGFR: 8.52
ERYTHROCYTE [DISTWIDTH] IN BLOOD BY AUTOMATED COUNT: 13.3 % (ref 11.5–14.5)
GLUCOSE SERPL-MCNC: 78 MG/DL (ref 70–99)
HCT VFR BLD AUTO: 29 % (ref 37–47)
HGB BLD-MCNC: 10.3 G/DL (ref 12–16)
MCHC RBC AUTO-ENTMCNC: 35.5 G/DL (ref 33–37)
MCV RBC AUTO: 87.6 FL (ref 81–99)
NRBC BLD AUTO-RTO: 0 %
PLATELET # BLD AUTO: 233 10^3/UL (ref 130–400)
POTASSIUM SERPL-SCNC: 4.1 MMOL/L (ref 3.5–5.1)
PROT SERPL-MCNC: 6.5 G/DL (ref 6.3–8.2)
SODIUM SERPL-SCNC: 135 MMOL/L (ref 135–145)

## 2025-03-03 PROCEDURE — 99283 EMERGENCY DEPT VISIT LOW MDM: CPT

## 2025-03-04 ENCOUNTER — HOSPITAL ENCOUNTER (EMERGENCY)
Dept: HOSPITAL 99 - EMR | Age: 60
Discharge: HOME | End: 2025-03-04
Payer: COMMERCIAL

## 2025-03-04 VITALS — RESPIRATION RATE: 17 BRPM | DIASTOLIC BLOOD PRESSURE: 74 MMHG | SYSTOLIC BLOOD PRESSURE: 156 MMHG

## 2025-03-04 VITALS — RESPIRATION RATE: 13 BRPM | DIASTOLIC BLOOD PRESSURE: 73 MMHG | SYSTOLIC BLOOD PRESSURE: 170 MMHG

## 2025-03-04 VITALS — SYSTOLIC BLOOD PRESSURE: 135 MMHG | RESPIRATION RATE: 16 BRPM | DIASTOLIC BLOOD PRESSURE: 61 MMHG

## 2025-03-04 VITALS — SYSTOLIC BLOOD PRESSURE: 102 MMHG | DIASTOLIC BLOOD PRESSURE: 61 MMHG

## 2025-03-04 VITALS — DIASTOLIC BLOOD PRESSURE: 65 MMHG | SYSTOLIC BLOOD PRESSURE: 118 MMHG | RESPIRATION RATE: 20 BRPM

## 2025-03-04 VITALS — RESPIRATION RATE: 19 BRPM | SYSTOLIC BLOOD PRESSURE: 144 MMHG | DIASTOLIC BLOOD PRESSURE: 85 MMHG

## 2025-03-04 VITALS — SYSTOLIC BLOOD PRESSURE: 145 MMHG | RESPIRATION RATE: 19 BRPM | DIASTOLIC BLOOD PRESSURE: 84 MMHG

## 2025-03-04 VITALS — RESPIRATION RATE: 17 BRPM | DIASTOLIC BLOOD PRESSURE: 62 MMHG | SYSTOLIC BLOOD PRESSURE: 141 MMHG

## 2025-03-04 VITALS — RESPIRATION RATE: 15 BRPM | SYSTOLIC BLOOD PRESSURE: 90 MMHG | DIASTOLIC BLOOD PRESSURE: 56 MMHG

## 2025-03-04 VITALS — DIASTOLIC BLOOD PRESSURE: 76 MMHG | SYSTOLIC BLOOD PRESSURE: 101 MMHG

## 2025-03-04 VITALS — SYSTOLIC BLOOD PRESSURE: 138 MMHG | RESPIRATION RATE: 16 BRPM | DIASTOLIC BLOOD PRESSURE: 64 MMHG

## 2025-03-04 VITALS — DIASTOLIC BLOOD PRESSURE: 77 MMHG | SYSTOLIC BLOOD PRESSURE: 90 MMHG | RESPIRATION RATE: 24 BRPM

## 2025-03-04 VITALS — SYSTOLIC BLOOD PRESSURE: 151 MMHG | RESPIRATION RATE: 18 BRPM | DIASTOLIC BLOOD PRESSURE: 74 MMHG

## 2025-03-04 VITALS — DIASTOLIC BLOOD PRESSURE: 66 MMHG | SYSTOLIC BLOOD PRESSURE: 120 MMHG | RESPIRATION RATE: 14 BRPM

## 2025-03-04 VITALS — RESPIRATION RATE: 16 BRPM

## 2025-03-04 VITALS — RESPIRATION RATE: 14 BRPM

## 2025-03-04 VITALS — SYSTOLIC BLOOD PRESSURE: 147 MMHG | DIASTOLIC BLOOD PRESSURE: 62 MMHG | RESPIRATION RATE: 13 BRPM

## 2025-03-04 VITALS — RESPIRATION RATE: 17 BRPM

## 2025-03-04 VITALS — RESPIRATION RATE: 24 BRPM

## 2025-03-04 VITALS — DIASTOLIC BLOOD PRESSURE: 70 MMHG | SYSTOLIC BLOOD PRESSURE: 151 MMHG

## 2025-03-04 VITALS — SYSTOLIC BLOOD PRESSURE: 145 MMHG | RESPIRATION RATE: 20 BRPM | DIASTOLIC BLOOD PRESSURE: 74 MMHG

## 2025-03-04 VITALS — RESPIRATION RATE: 17 BRPM | SYSTOLIC BLOOD PRESSURE: 140 MMHG | DIASTOLIC BLOOD PRESSURE: 78 MMHG

## 2025-03-04 VITALS — DIASTOLIC BLOOD PRESSURE: 66 MMHG | SYSTOLIC BLOOD PRESSURE: 131 MMHG | RESPIRATION RATE: 16 BRPM

## 2025-03-04 VITALS — SYSTOLIC BLOOD PRESSURE: 135 MMHG | RESPIRATION RATE: 15 BRPM | DIASTOLIC BLOOD PRESSURE: 61 MMHG

## 2025-03-04 VITALS — RESPIRATION RATE: 19 BRPM

## 2025-03-04 VITALS — SYSTOLIC BLOOD PRESSURE: 148 MMHG | DIASTOLIC BLOOD PRESSURE: 82 MMHG | RESPIRATION RATE: 20 BRPM

## 2025-03-04 VITALS — RESPIRATION RATE: 12 BRPM | SYSTOLIC BLOOD PRESSURE: 121 MMHG | DIASTOLIC BLOOD PRESSURE: 62 MMHG

## 2025-03-04 VITALS — RESPIRATION RATE: 13 BRPM

## 2025-03-04 VITALS — BODY MASS INDEX: 32.3 KG/M2

## 2025-03-04 DIAGNOSIS — D63.1: ICD-10-CM

## 2025-03-04 DIAGNOSIS — E11.22: ICD-10-CM

## 2025-03-04 DIAGNOSIS — N18.6: ICD-10-CM

## 2025-03-04 DIAGNOSIS — E03.9: ICD-10-CM

## 2025-03-04 DIAGNOSIS — I13.2: ICD-10-CM

## 2025-03-04 DIAGNOSIS — Z79.4: ICD-10-CM

## 2025-03-04 DIAGNOSIS — Z99.2: ICD-10-CM

## 2025-03-04 DIAGNOSIS — Z79.899: ICD-10-CM

## 2025-03-04 DIAGNOSIS — E87.20: ICD-10-CM

## 2025-03-04 DIAGNOSIS — E83.39: ICD-10-CM

## 2025-03-04 DIAGNOSIS — I50.9: ICD-10-CM

## 2025-03-04 DIAGNOSIS — Z91.158: ICD-10-CM

## 2025-03-04 DIAGNOSIS — F17.210: ICD-10-CM

## 2025-03-04 DIAGNOSIS — M54.9: Primary | ICD-10-CM

## 2025-03-04 LAB
ALBUMIN SERPL-MCNC: 3.6 G/DL (ref 3.5–5)
ALP SERPL-CCNC: 102 U/L (ref 38–126)
ALT SERPL-CCNC: 16 U/L (ref 0–35)
AST SERPL-CCNC: 21 U/L (ref 14–36)
BUN SERPL-MCNC: 65 MG/DL (ref 7–17)
CALCIUM SERPL-MCNC: 9.4 MG/DL (ref 8.4–10.2)
CHLORIDE SERPL-SCNC: 112 MMOL/L (ref 98–107)
CO2 SERPL-SCNC: 11 MMOL/L (ref 22–30)
EGFR: 8.92
ERYTHROCYTE [DISTWIDTH] IN BLOOD BY AUTOMATED COUNT: 13.4 % (ref 11.5–14.5)
GLUCOSE SERPL-MCNC: 100 MG/DL (ref 70–99)
HCT VFR BLD AUTO: 27 % (ref 37–47)
HGB BLD-MCNC: 9.5 G/DL (ref 12–16)
LIPASE SERPL-CCNC: 299 U/L (ref 23–300)
MCHC RBC AUTO-ENTMCNC: 35.2 G/DL (ref 33–37)
MCV RBC AUTO: 87.1 FL (ref 81–99)
NRBC BLD AUTO-RTO: 0 %
PLATELET # BLD AUTO: 218 10^3/UL (ref 130–400)
POTASSIUM SERPL-SCNC: 4.2 MMOL/L (ref 3.5–5.1)
PROT SERPL-MCNC: 6.1 G/DL (ref 6.3–8.2)
SODIUM SERPL-SCNC: 136 MMOL/L (ref 135–145)

## 2025-03-04 PROCEDURE — P9047 ALBUMIN (HUMAN), 25%, 50ML: HCPCS

## 2025-03-04 PROCEDURE — 99285 EMERGENCY DEPT VISIT HI MDM: CPT

## 2025-03-04 PROCEDURE — G0257 UNSCHED DIALYSIS ESRD PT HOS: HCPCS

## 2025-03-04 RX ADMIN — HEPARIN SODIUM 3700 UNITS: 1000 INJECTION, SOLUTION INTRAVENOUS; SUBCUTANEOUS at 16:12

## 2025-03-04 RX ADMIN — HEPARIN SODIUM 500 UNITS: 1000 INJECTION, SOLUTION INTRAVENOUS; SUBCUTANEOUS at 12:55

## 2025-03-04 RX ADMIN — ALBUMIN HUMAN 12.5 GRAMS: 0.25 SOLUTION INTRAVENOUS at 15:25

## 2025-03-04 RX ADMIN — MANNITOL 12.5 GRAMS: 12.5 INJECTION, SOLUTION INTRAVENOUS at 15:20

## 2025-03-04 RX ADMIN — EPOETIN ALFA-EPBX 4000 UNITS: 4000 INJECTION, SOLUTION INTRAVENOUS; SUBCUTANEOUS at 14:36

## 2025-03-04 RX ADMIN — HEPARIN SODIUM 500 UNITS: 1000 INJECTION, SOLUTION INTRAVENOUS; SUBCUTANEOUS at 13:55

## 2025-03-04 NOTE — PHANOTE
MED REC NOTE- CALLED NURSING [place] FOR MED LIST -[phone] TO HAVE THEM FAX IT OVER.
[de-identified] : Body Part: Cervical spine  Length of symptoms/ DOI: ~7 months with NKI  Cause of accident/ injury: Pt reports pain in right shoulder- went to PT and pain traveled into neck  Radicular symptoms: Pain in right shoulder, pain and tingling in bilat UEs R>L, pain into ribs and chest  Quality of pain: sharp, popping, spasms  Pain at Rest: 7 /10 Pain with activity/ walking:  10 /10 Pain worsens with: activity  Pain improves with: ice, heat  Previous treatments: Muscle Relaxers in the past with much relief.  Recent Imaging: None  Current treatments: HEP- pt goes to PT for her shoulder - reports learning neck exercises and doing them 3x/week with increased pain and stiffness  Current medications for pain: Advil, ASA, etc with no relief  Work status/ occupation:    Assisted walking device: None

## 2025-03-05 ENCOUNTER — TELEPHONE (OUTPATIENT)
Age: 60
End: 2025-03-05

## 2025-03-05 NOTE — TELEPHONE ENCOUNTER
Contacted patient off of Talk Therapy  wait list to verify needs of services in attempts to update list with patient preferences. LVM for patient to contact intake dept  in regards to updating wait list due to vm not being set up.